# Patient Record
Sex: FEMALE | Race: BLACK OR AFRICAN AMERICAN | NOT HISPANIC OR LATINO | Employment: FULL TIME | URBAN - METROPOLITAN AREA
[De-identification: names, ages, dates, MRNs, and addresses within clinical notes are randomized per-mention and may not be internally consistent; named-entity substitution may affect disease eponyms.]

---

## 2023-03-18 PROBLEM — N92.1 BREAKTHROUGH BLEEDING WITH IUD: Status: ACTIVE | Noted: 2023-03-18

## 2023-03-18 PROBLEM — G47.30 SLEEP DISORDER BREATHING: Status: ACTIVE | Noted: 2023-03-18

## 2023-03-18 PROBLEM — A59.9 TRICHIMONIASIS: Status: ACTIVE | Noted: 2023-03-18

## 2023-03-18 PROBLEM — K21.9 GERD (GASTROESOPHAGEAL REFLUX DISEASE): Status: ACTIVE | Noted: 2023-03-18

## 2023-03-18 PROBLEM — S39.012A LUMBOSACRAL STRAIN: Status: ACTIVE | Noted: 2023-03-18

## 2023-03-18 PROBLEM — R51.9 FREQUENT HEADACHES: Status: ACTIVE | Noted: 2023-03-18

## 2023-03-18 PROBLEM — S46.911A STRAIN OF RIGHT SHOULDER: Status: ACTIVE | Noted: 2023-03-18

## 2023-03-18 PROBLEM — Z98.84 BARIATRIC SURGERY STATUS: Status: ACTIVE | Noted: 2023-03-18

## 2023-03-18 PROBLEM — M79.642 PAIN OF LEFT HAND: Status: ACTIVE | Noted: 2023-03-18

## 2023-03-18 PROBLEM — M54.2 CERVICAL SPINE PAIN: Status: ACTIVE | Noted: 2023-03-18

## 2023-03-18 PROBLEM — M25.569 JOINT PAIN, KNEE: Status: ACTIVE | Noted: 2023-03-18

## 2023-03-18 PROBLEM — N92.6 IRREGULAR MENSES: Status: ACTIVE | Noted: 2023-03-18

## 2023-03-18 PROBLEM — I10 BENIGN HYPERTENSION: Status: ACTIVE | Noted: 2023-03-18

## 2023-03-18 PROBLEM — N94.6 DYSMENORRHEA: Status: ACTIVE | Noted: 2023-03-18

## 2023-03-18 PROBLEM — E66.01 MORBID OBESITY (MULTI): Status: ACTIVE | Noted: 2023-03-18

## 2023-03-18 PROBLEM — N92.0 MENORRHAGIA: Status: ACTIVE | Noted: 2023-03-18

## 2023-03-18 PROBLEM — R29.818 SUSPECTED SLEEP APNEA: Status: ACTIVE | Noted: 2023-03-18

## 2023-03-18 PROBLEM — Z97.5 BREAKTHROUGH BLEEDING WITH IUD: Status: ACTIVE | Noted: 2023-03-18

## 2023-03-18 PROBLEM — S29.019A STRAIN OF THORACIC REGION: Status: ACTIVE | Noted: 2023-03-18

## 2023-03-18 PROBLEM — M54.50 LUMBAR SPINE PAIN: Status: ACTIVE | Noted: 2023-03-18

## 2023-05-05 LAB
CHORIOGONADOTROPIN (MIU/ML) IN SER/PLAS: <3 IU/L
DEHYDROEPIANDROSTERONE SULFATE (DHEA-S) (UG/DL) IN SER/: 135 UG/DL (ref 65–395)
ESTIMATED AVERAGE GLUCOSE FOR HBA1C: 91 MG/DL
ESTRADIOL (PG/ML) IN SER/PLAS: 44 PG/ML
FOLLITROPIN (IU/L) IN SER/PLAS: 8 IU/L
HEMOGLOBIN A1C/HEMOGLOBIN TOTAL IN BLOOD: 4.8 %
PROLACTIN (UG/L) IN SER/PLAS: 10.4 UG/L (ref 3–20)
THYROTROPIN (MIU/L) IN SER/PLAS BY DETECTION LIMIT <= 0.05 MIU/L: 1.7 MIU/L (ref 0.44–3.98)

## 2023-05-10 LAB — 17-HYDROXYPROGESTERONE (REFLAB): 45.55 NG/DL

## 2023-05-12 LAB
TESTOSTERONE FREE (CHAN): 7.7 PG/ML (ref 0.1–6.4)
TESTOSTERONE,TOTAL,LC-MS/MS: 43 NG/DL (ref 2–45)

## 2023-10-09 RX ORDER — ACETAMINOPHEN 325 MG/1
325 TABLET ORAL EVERY 6 HOURS PRN
COMMUNITY
Start: 2021-08-28 | End: 2023-10-26 | Stop reason: WASHOUT

## 2023-10-09 NOTE — PROGRESS NOTES
Subjective     Date: 10/9/2023 Time: 10:39 AM  Name: Nehemiah Ortiz  MRN: [id number]    This is a 23 y.o. female with morbid obesity (There is no height or weight on file to calculate BMI.) who presents to clinic for consideration of bariatric surgery. she has attempted and failed multiple diet and exercise regimens for weight loss. Initial Onset of obesity was during adolescence.  Their goal for surgery is to  be healthier  and lose weight. The patient has tried multiple diets to lose weight including Low Calorie. The patient was most successful with the low calorie diet. The most pounds lost on this diet were 50 lbs. The patient considers their dietary weakness to be portion size The patient reports a  highest weight ever of 425 pounds and lowest weight ever of 350 pounds Distribution of Obesity: is central. C     Comorbidities: polycystic ovarian syndrome         2 = Symptoms noticeable and bothersome but not every day    PMH:   Past Medical History:   Diagnosis Date    Encounter for insertion of intrauterine contraceptive device 10/15/2018    Encounter for IUD insertion    Encounter for pregnancy test, result negative 10/15/2018    Negative pregnancy test    Other conditions influencing health status     Denial Of Any Significant Medical History    Unspecified dislocation of unspecified patella, initial encounter 03/17/2014    Dislocation, patella closed        PSH:   Past Surgical History:   Procedure Laterality Date    KNEE SURGERY  01/06/2014    Knee Surgery        No personal/family hx of VTE.    FAMILY HISTORY:  Family History   Problem Relation Name Age of Onset    Thyroid disease Mother      Hypertension Father      Asthma Other      Depression Other      Hypertension Other          SOCIAL HISTORY:   Smoking currently    MEDICATIONS:  Prior to Admission Medications:  Medication Documentation Review Audit    **Prior to Admission medications have not yet been reviewed**          ALLERGIES:  No Known  Allergies    REVIEW OF SYSTEMS:  GENERAL: Negative for malaise, significant weight loss and fever  HEAD: Negative for headache, swelling.  NECK: Negative for lumps, goiter, pain and significant neck swelling  RESPIRATORY: Negative for cough, wheezing or shortness of breath.  CARDIOVASCULAR: Negative for chest pain, leg swelling or palpitations.  GI: Negative for abdominal discomfort, blood in stools or black stools or change in bowel habits  : No history of dysuria, frequency or incontinence  MUSCULOSKELETAL: Negative for joint pain or swelling, back pain or muscle pain.  SKIN: Negative for lesions, rash, and itching.  PSYCH: Negative for sleep disturbance, mood disorder and recent psychosocial stressors.  ENDOCRINE: Negative for cold or heat intolerance, polyuria, polydipsia and goiter.    Objective   PHYSICAL Exam       Video visit    Assessment/Plan   IMPRESSION:  Nehemiah Ortiz is a 23 y.o. female with a BMI of There is no height or weight on file to calculate BMI. with the following diagnoses and co-morbidities:     Past Medical History:   Diagnosis Date    Encounter for insertion of intrauterine contraceptive device 10/15/2018    Encounter for IUD insertion    Encounter for pregnancy test, result negative 10/15/2018    Negative pregnancy test    Other conditions influencing health status     Denial Of Any Significant Medical History    Unspecified dislocation of unspecified patella, initial encounter 03/17/2014    Dislocation, patella closed       This patient does meet the criteria for a surgical weight loss procedure according to NIH guidelines.  The risks of sleeve gastrectomy, Jelani-en-Y gastric bypass, and duodenal switch surgery including but not limited to bleeding, leak along staple lines, infection, dehydration, ulcers, internal hernia, DVT/PE, pneumonia, myocardial infarction, prolonged nausea/vomiting, incomplete resolution of associated medical conditions, reflux, weight regain, vitamin/mineral  deficiencies, and death have been explained to the patient and Nehemiah Ortiz has expressed understanding and acceptance of them.       She wants to proceed with Gastric ByPass.         We discussed the lifestyle changes necessary to be successful following surgery.    The increased risk of substance and alcohol abuse following bariatric surgery was discussed with the patient, along with the negative consequences of substance/alcohol use after surgery including addiction, worsening of mental health disorders, and injury to the stomach. The risk of smoking and vaping (tobacco or any other substance) after bariatric surgery was explained to the patient. This includes risk of anastamotic ulcers, gastritis, bleeding, perforation, stricture, and PO intolerance.  The patient expressed understanding and acceptance of these risks.    The patient was advised not to become pregnant within 12-18 months following bariatric surgery. She was educated on the increased risks to mother and fetus associated with pregnancy within 2 years of bariatric surgery.    The benefits of the above surgeries including weight loss, improvement/resolution of associated medical and mental health conditions, improved mobility, and decreased mortality have been explained the the patient and Nehemiah Ortiz has expressed understanding and acceptance of them.      PLAN:  The plan of treatment for Nehemiah Ortiz is to continue with the consultations and tests ordered today in hopes of qualifying for pre-operative clearance for bariatric surgery. This includes:    Consult Nutrition for education   Consult Psychology  Consult Cardiology  Consult Pulmonology  Labs ordered  EGD  PCP for medical optimization  Consult sleep medicine - concern for BRIANDA  Recommend at least 5-10 lbs of weight loss prior to surgery.      The following are some lifestyle changes you should begin to prepare you for your surgery.   Eliminate soda and other carbonated beverages  from your diet. Carbonation will not be well tolerated after surgery. Try Propel, Vitamin Water Zero, Sobe Lifewater, Crystal Light or water.    Increase fluid consumption to 64 oz daily. Do not drink within 30 minutes of eating as this will liquefy your food and make you hungry more quickly.    Exercise for 30-60 minutes daily. Brisk walking, bike riding and swimming are all examples of healthy exercise. If you are unable to exercise we recommend seated exercise.    Do not skip meals.    Take a multivitamin daily.    Lose weight. In preparation for your surgery it is important that you begin making healthier food choices now. Our dietitian will meet with you to help you select foods lower in calories and higher in nutrition. We would like you to lose at least 5-10 lbs prior to surgery.     Increase your protein intake to 60 grams per day.    Alcohol is empty calories. Please eliminate while preparing for surgery.    Plan your meals.      General Instruction:   1) Use the information we gave you today to work through your insurance requirements and medical clearances.   2) These documents need to get faxed or emailed to the program navigators so they can submit them for approval from your insurance company.   3) Obtain labs today at a  facility. We will call you with any abnormalities and corrections you need to make.   4) Continue to work with your primary care doctor and other specialist so your other health problems are well controlled prior to your surgery.   5) Adopt the recommendations of the program dietician so you develop healthy eating patterns.   6) Work with the sleep team to get your sleep apnea treated to prevent other health problems .   7) Consider attending a support group to learn from other who have been through the process.   8) Come to the MS[facility] sessions.

## 2023-10-09 NOTE — PROGRESS NOTES
Surgeon: Yaima  Patient is considering:  undecided    ASSESSMENT:  Current weight: 376 lbs    Ht: 63  in   BMI: 66.6       Initial start weight: 376 lbs   Pre-Op Excess Body Weight (EBW):   235 lbs  Target Post-Op weight goal: GB: 188 - 235 lbs; S - 258 lbs    Food allergies/intolerances:  lactose intolerant - no dairy; no beef or pork  Chewing/Swallowing/Dentition:  none  Nausea / Vomiting / Hx Gastroparesis:  none  Diarrhea/ Constipation: none  Exercise level:   4 days/week 1-2 hours  Smoking/Tobacco use: yes smoking   Vitamins/Minerals supplements:  MVI   Medications:   see list  Past diet attempts:   exercise/, vegan, cabbage soup, low CHO, keto   Hours of sleep/night: 9-10    24 HOUR RECALL/DIET HISTORY:  Breakfast:  x2 HB eggs, grapefruit and turkey plascencia  Snack:    Lunch: salad no meat  Snack:   Dinner:  chicken breast, cauliflower rice, broccoli   Snack: sometimes grapes   Beverages: 1 gallon water and green tea, coffee occasionally   Alcohol: none    Person responsible for cooking & shopping? Self  How often do you eat sweet snacks?  Granola and fruit   How often do you eat savory snacks?  Pretzels mostly   How often do you eat out?  1/month  Do you feel overly stuffed?  No  Binge Eating? No  Night Eating?  No  Emotional Eating?  Tends to not eat when feeling stressed or depressed       READINESS TO LEARN:  Motivation to learn: Interested        Understanding of instruction: Good    Anticipated Compliance: Good         Family Support: Unable to assess-family not present          Educational Materials Provided:    Plate Method  High Protein Snack List  High Protein Drink - lactose free  High Protein food list  Schedules for MSWL class  Goals sheet    Appointment was conducted via phone d/t COVID-19 protocol. Patient's weight is self-reported. Patient will scheduled to attend a Virtual Education Class to review the 2 week Pre-op diet, Post op fluid, protein,  vitamin/mineral supplementation, exercise goals and post op diet progression.    Patient presents with excessive calorie obesity seeking weight loss surgery.    Patient seen today to complete nutrition evaluation for WLS. Patient has been in process before to get to surgery, feels she is more ready now. Patient has been working with a  and had been following their recommended meal plan. Patient is trying to eat 3 meals/day, breakfast can be a challenging meal. Patient reports only drinking water and some tea/coffee.   Recommended to continue to eat 3 meals/day reminded to aim for 3-4oz protein (20-30g) per meal. Reviewed post op behaviors and reminded to begin practicing. Continue exercise, MVI daily.     Patient was receptive to nutritional recommendations, asked numerous questions, and verbalized understanding of the weight loss surgery diet.  Patient expressed understanding about the importance of strict dietary compliance post-surgery to avoid nutritional deficiencies and achieve optimal weight loss and verbalized intent to follow dietary recommendations.      Malnutrition Screening:  Significant unintentional weight loss? No  Eating less than 75% of usual intake for more than 2 weeks? No      Nutrition Diagnosis:   1. Overweight/obesity related to excess energy intake as evidenced by BMI = 40 kg/m^2.  2. Food- and nutrition-related knowledge deficit related to lack of prior exposure to surgical weight loss information as evidenced by pt new to surgical program.    Nutrition Interventions:   1. Modify type and amount of food and nutrients within meals and snacks.  2. Comprehensive Nutrition Education    Recommendations:  1. Structure meal patterns, eating three meals and 1-2 snacks per day.   2. Aim for 3-4 ounces (20-30 grams) protein at each meal, 7-15g protein at snacks.  3. Practice eating slower by taking smaller bites and chewing your food thoroughly.   4. Drink 64oz of calorie-free, caffeine-free,  and non-carbonated beverages.   5. Practice no drinking 30 minutes before meals, nothing with meals and wait 30 minutes after meals to drink again.  6. Continue to exercise for 1-2 hours 4 days/week.  7. Your insurance requires 6 months of Medically Supervised Weight Loss (MSWL) classes. You will need to attend a class in October, November, and December. We will follow up for your 5th visit on January 15th  at 3:30 PM. You will need to weigh yourself before this appointment and provide a 24 hour food recall.      Pre-op Goal weight: lose 5% of body weight    Nutrition Monitoring and Evaluation: 1-2 pound weight loss per week  Criteria: weight check  Need for Follow-up:     Patient does meet [employer] Health guidelines for weight loss surgery, however needs to demonstrate consistent effort in making dietary changes before giving clearance. It is anticipated that the patient will need at least 1    nutritional follow-up visits prior to clearance for surgery.

## 2023-10-10 ENCOUNTER — NUTRITION (OUTPATIENT)
Dept: SURGERY | Facility: HOSPITAL | Age: 23
End: 2023-10-10
Payer: COMMERCIAL

## 2023-10-10 ENCOUNTER — TELEMEDICINE (OUTPATIENT)
Dept: SURGERY | Facility: HOSPITAL | Age: 23
End: 2023-10-10
Payer: COMMERCIAL

## 2023-10-10 VITALS — BODY MASS INDEX: 66.61 KG/M2 | WEIGHT: 293 LBS

## 2023-10-10 DIAGNOSIS — Z98.84 BARIATRIC SURGERY STATUS: ICD-10-CM

## 2023-10-10 DIAGNOSIS — E66.01 MORBID OBESITY (MULTI): ICD-10-CM

## 2023-10-10 DIAGNOSIS — E66.01 CLASS 3 SEVERE OBESITY WITH BODY MASS INDEX (BMI) OF 60.0 TO 69.9 IN ADULT, UNSPECIFIED OBESITY TYPE, UNSPECIFIED WHETHER SERIOUS COMORBIDITY PRESENT (MULTI): Primary | ICD-10-CM

## 2023-10-10 DIAGNOSIS — K21.9 GASTROESOPHAGEAL REFLUX DISEASE WITHOUT ESOPHAGITIS: ICD-10-CM

## 2023-10-10 PROCEDURE — 99213 OFFICE O/P EST LOW 20 MIN: CPT | Performed by: SURGERY

## 2023-10-10 PROCEDURE — 99213 OFFICE O/P EST LOW 20 MIN: CPT | Mod: GT | Performed by: SURGERY

## 2023-10-10 NOTE — PATIENT INSTRUCTIONS
The plan of treatment for Nehemiah Ortiz is to continue with the consultations and tests ordered today in hopes of qualifying for pre-operative clearance for bariatric surgery( Gastric Bypass) . This includes:    Consult Nutrition for education   Consult Psychology  Consult Cardiology  Consult Pulmonology  Labs ordered  EGD  PCP for medical optimization  Consult sleep medicine - concern for BRIANDA  Recommend at least 5-10 lbs of weight loss prior to surgery.      The following are some lifestyle changes you should begin to prepare you for your surgery.   Eliminate soda and other carbonated beverages from your diet. Carbonation will not be well tolerated after surgery. Try Propel, Vitamin Water Zero, Sobe Lifewater, Crystal Light or water.    Increase fluid consumption to 64 oz daily. Do not drink within 30 minutes of eating as this will liquefy your food and make you hungry more quickly.    Exercise for 30-60 minutes daily. Brisk walking, bike riding and swimming are all examples of healthy exercise. If you are unable to exercise we recommend seated exercise.    Do not skip meals.    Take a multivitamin daily.    Lose weight. In preparation for your surgery it is important that you begin making healthier food choices now. Our dietitian will meet with you to help you select foods lower in calories and higher in nutrition. We would like you to lose at least 5-10 lbs prior to surgery.     Increase your protein intake to 60 grams per day.    Alcohol is empty calories. Please eliminate while preparing for surgery.    Plan your meals.      General Instruction:   1) Use the information we gave you today to work through your insurance requirements and medical clearances.   2) These documents need to get faxed or emailed to the program navigators so they can submit them for approval from your insurance company.   3) Obtain labs today at a  facility. We will call you with any abnormalities and corrections you need to make.    4) Continue to work with your primary care doctor and other specialist so your other health problems are well controlled prior to your surgery.   5) Adopt the recommendations of the program dietician so you develop healthy eating patterns.   6) Work with the sleep team to get your sleep apnea treated to prevent other health problems .   7) Consider attending a support group to learn from other who have been through the process.   8) Come to the MS[facility] sessions.

## 2023-10-21 PROBLEM — R29.818 SUSPECTED SLEEP APNEA: Status: RESOLVED | Noted: 2023-03-18 | Resolved: 2023-10-21

## 2023-10-21 NOTE — PATIENT INSTRUCTIONS
[facility] Sleep Medicine  [facility] ONE  [facility] ONE  [address] [address]       Thank you for coming to the Sleep Medicine Clinic today! Your sleep medicine provider today was: ISIDRA Saldana Below is a summary of your treatment plan, patient education, other important information, and our contact numbers.    Dear Nehemiah Ortiz,    Great to meet you today.  Thank you for visiting  Sleep Medicine Clinic !     1. We will proceed with a SPLIT NIGHT sleep study. The lab will call you and schedule it for you.     2. Please do not drive when you are sleepy and  start practicing the sleep hygiene  as discussed in clinic today.     3. Your blood pressure was elevated in the office today. Please obtain a home blood pressure monitoring kit and log your blood pressures at home and follow up with your primary care physician.    4. We are going to do blood work to investigate your restless legs syndrome. Please fast 8 hours prior blood draw.    5. FOR QUESTIONS AND CONCERNS:   a): To schedule, cancel, or reschedule SLEEP STUDY appointments, please call [address] [place] [address]  b): Please call my office with issues or questions: [phone] ([place]); [phone] ([facility])    If you have a CPAP or BiPAP machine at home, please bring the unit and all accessories including the power cord to your appointments unless I tell you otherwise. Please have knowledge of the DME company you worked with to receive your PAP device. If you have copies of any previous sleep testing completed outside of , please bring with you to clinic as well. This information will make our visits more productive.     If you are new to CPAP or BiPAP, please note the minimum usage insurance requires to continuing coverage for the equipment as noted by your DME company. Please discuss equipment issues (PAP unit, mask fit, humidification, etc.) with your DME company first.       In the event that  you are running more than 10 minutes late to your appointment, I will kindly ask you to reschedule. Thanks.    Follow-up Appointment: 2-3 weeks after sleep study      Follow-up Appointment: 2-3 weeks after sleep study    PATIENT EDUCATION     Obstructive Sleep Apnea (BRIANDA) is a sleep disorder where your upper airway muscles relax during sleep and the airway intermittently and repetitively narrows and collapses leading to partially blocked airway (hypopnea) or completely blocked airway (apnea) which, in turn, can disrupt breathing in sleep, lower oxygen levels while you sleep and cause night time wakings. Because both apnea and hypopnea may cause higher carbon dioxide or low oxygen levels, untreated BRIANDA can lead to heart arrhythmia, elevation of blood pressure, and make it harder for the body to consolidate memory and facilitate metabolism (leading to higher blood sugars at night). Frequent partial arousals occur during sleep resulting in sleep deprivation and daytime sleepiness. BRIANDA is associated with an increased risk of cardiovascular disease, stroke, hypertension, and insulin resistance. Moreover, untreated BRIANDA with excessive daytime sleepiness can increase the risk of motor vehicular accidents.    Some conservative strategies for BRIANDA regardless of BRIANDA severity are:   Positional therapy - Avoid sleeping on your back.   Healthy diet and regular exercise to optimize weight is highly encouraged.   Avoid alcohol late in the evening and sedative-hypnotics as these substances can make sleep apnea worse.   Improve breathing through the nose with intranasal steroid spray, saline rinse, or antihistamines    Safety: Avoid driving vehicle and operating heavy equipment while sleepy. Drowsy driving may lead to life-threatening motor vehicle accidents. A person driving while sleepy is 5 times more likely to have an accident. If you feel sleepy, pull over and take a short power nap (sleep for less than 30 minutes). Otherwise,  ask somebody to drive you.    Treatment options for sleep apnea include weight management, positional therapy, Positive Airway Therapy (PAP) therapy, oral appliance therapy, hypoglossal nerve stimulator (Inspire) and select airway surgeries.    You can also go to the following EDUCATION WEBSITES for further information:   American Academy of Sleep Medicine http://sleepeducation.org  National Sleep Foundation: https://sleepfoundation.org  American Sleep Apnea Association: https://www.sleepapnea.org (for patients with sleep apnea)  Narcolepsy Network: https://www.narcolepsynetwork.org (for patients with narcolepsy)  [employer]: https://www.Pel[employer] Technologycolepsy.org (for patients with narcolepsy)  Hypersomnia Foundation: https://www.hypersomniafoundation.org (for patients with idiopathic hypersomnia)  RLS foundation: https://www.rls.org (for patients with restless leg syndrome)    IMPORTANT INFORMATION     Call 911 for medical emergencies.  Our offices are generally open from Monday-Friday, 8 am - 5 pm.   There are no supporting services by either the sleep doctors or their staff on weekends and Holidays, or after 5 PM on weekdays.   If you need to get in touch with me, you may either call my office number or you can use [employer].  If a referral for a test, for CPAP, or for another specialist was made, and you have not heard about scheduling this within a week, please call scheduling at [phone]).  If you are unable to make your appointment for clinic or an overnight study, kindly call the office or sleep testing center at least 48 hours in advance to cancel and reschedule.  If you are on CPAP, please bring your device's card and/or the device to each clinic appointment.   In case of problems with PAP machine or mask interface, please contact your [employer] (Durable Medical Equipment) company first. [employer] is the company who provides you the machine and/or PAP supplies.       PRESCRIPTIONS     We require 7 days  advanced notice for prescription refills. If we do not receive the request in this time, we cannot guarantee that your medication will be refilled in time.    IMPORTANT PHONE NUMBERS     Sleep Medicine Clinic Fax: [phone]  Appointments (for Pediatric Sleep Clinic): [phone] - option 1  Appointments (for Adult Sleep Clinic): [phone] - option 2  Appointments (For Sleep Studies): [phone] - option 3  Behavioral Sleep Medicine: [phone]  Sleep Surgery: [phone]  Nutrition Service: [phone]  Weight management clinics with endocrinology: [phone]  Bariatric Services: [phone] (includes weight loss medications and weight loss surgery)  [facility]: [phone] (offers holistic approaches to weight management)  ENT (Otolaryngology): [phone]  Headache Clinic (Neurology): [phone]  Neurology: [phone]  Psychiatry: [phone]  Pulmonary Function Testing (PFT) Center: [phone]  Pulmonary Medicine: [phone]  [employer] (DME): [phone]      OUR SLEEP TESTING LOCATIONS     Our team will contact you to schedule your sleep study, however, you can contact us as follow:  Main Phone Line (scheduling only): [phone]), option 3  Adult and Pediatric Locations  [facility] (6 years and older): [facility] - 4th floor ([address] [place] [address]) After hours line: [phone]  [facility] (Main campus: All ages): [facility], 6th floor. After hours line: [phone]   [place] (5 years and older; younger considered on case-by-case basis): [address]; [address], [address]. Scheduling  After hours line: [phone]   [place] (6 years and older): [address]; [address]; [address]   [place] (6 years and older): [address] [facility], [address]  After hours line: [phone]  [facility] (13 years and older) in [place]:  "[address], 2nd floor  After hours line: [phone]   [place] (13 year and older): [address]  After hours line: [phone]     Adult Only Locations:   [place] (18 years and older): [address] [facility], 2nd floor   [place] (18 years and older): [address] [facility]; 4th floor  After hours line: [phone]  [facility] (18 years and older) at [place]: [address] [facility]  After hours line: [phone]     CONTACTING YOUR SLEEP MEDICINE PROVIDER AND SLEEP TEAM      For issues with your machine or mask interface, please call your DME provider first. DME stands for durable medical company. DME is the company who provides you the machine and/or PAP supplies / accessories.   To schedule, cancel, or reschedule SLEEP STUDY APPOINTMENTS, please call the Main Phone Line at [phone] - option 3.   To schedule, cancel, or reschedule CLINIC APPOINTMENTS, you can do it in \"[employer]\", call [phone] to speak with my  (Cintia Cedillo), or call the Main Phone Line at [phone] - option 2  For CLINICAL QUESTIONS or MEDICATION REFILLS, please call direct line for Adult Sleep Nurses at [phone].   Lastly, you can also send a message directly to your provider through \"My Chart\", which is the email service through your  Records Account: [email or web address]       Here at [facility], we wish you a restful sleep!   "

## 2023-10-21 NOTE — PROGRESS NOTES
[facility] Sleep Medicine  [facility] ONE  [facility] ONE  [address] [place] [address]     [facility] Sleep Medicine Clinic  New Visit Note  Subjective   Patient ID: Nehemiah Ortiz is a 23 y.o. female with past medical history significant for morbid obesity, hypertension sleep disorder breathing, and bariatric surgery status.    Patient is here alone today and referred by Dr. Erwin for comprehensive sleep medicine evaluation due to snoring.    HPI  Patient had been having snoring for the past 10 years. But did not try anything to treat it.    SLEEP STUDY HISTORY: (personally reviewed raw data such as interpretation report, data sheet, hypnogram, and titration table if available and applicable)  Not done      SLEEP-WAKE SCHEDULE  Bedtime: 8-9 PM on weekdays, 9-10 PM on weekends  Subjective sleep latency: 10 minutes  Problems falling asleep: N  Number of awakenings: 0-1 times per night spontaneously for bathroom  Problems staying asleep: N  Final wake time: 5AM on weekdays, 8-9 AM on weekends  Out of bed time: 5AM on weekdays, 9 AM on weekends  Shift work: N  Naps: N  Average sleep duration (excluding naps): 8-9 hours    SLEEP ENVIRONMENT  Sleep location: bed  Sleep status: sleeps with boy friend  Room is dark:  Yes  Room is quiet: Yes  Room is cool: Yes  Bed comfort: good    SLEEP HABITS:   Activities before bedtime: N  Activities in bed: N  Preferred sleep position: stomach  Clockwatching: No   Uses alarm to wake up: No     SLEEP ROS:  Night symptoms: Patient denies symptoms of snoring or breathing concerns at night  Morning symptoms: Patient denies morning symptoms and admits waking up refreshed in the morning.   Daytime symptoms Patient denies daytime sleepiness, fatigue, and drowsy driving. Patient also denies issues with memory, mood, and concentration.  Hypersomnia / narcolepsy symptoms: Patient denies symptoms of a hypersomnolence disorder such as  sleep paralysis, sleep-related hallucinations, recurrent sleep attacks, automatic behaviors, and cataplexy.   RLS symptoms: Patient denies RLS symptoms.  Movements in sleep: Patient denies problematic movements in sleep,   Parasomnia symptoms: Patient denies symptoms of parasomnia.    REVIEW OF SYSTEMS: All other systems have been reviewed and are negative.    PERTINENT SOCIAL HISTORY:  Occupation:  in the [employer]  Smoking: No   ETOH: No   Marijuana: No   Caffeine: Yes  Sleep aids: No   Claustrophobia: No     PERTINENT PAST SURGICAL HISTORY:  non-contributory    PERTINENT FAMILY HISTORY:  Patient denies family history of any sleep disorder.    Active Problems, Allergy List, Medication List, and PMH/PSH/FH/Social Hx have been reviewed and reconciled in chart. No significant changes unless documented in the pertinent chart section. Updates made when necessary.       Objective     Vitals:    10/25/23 1128   BP: 130/81   Pulse: 53   Resp: 16   SpO2: 97%        ESS: 9  GHADA :2    Physical Exam  Constitutional:alert and oriented to time, place, and person  Sinus: No tenderness to palpation  Palate: High-arched   Mallampati 2, Tonsils: 3  Yes, Tongue scalloping, Yes: macroglossia  Lungs: Clear to auscultation bilateral, no rales  Heart: Regular rate and rhythm, no murmurs    Assessment/Plan   Nehemiah Ortiz is a 23 y.o. female who is seen to evaluate for sleep disorder breathing possible obstructive sleep apnea. The pathophysiology of sleep apnea, diagnostic testing ( PSG), treatment options (PAP, oral appliance, surgery, hypoglossal nerve stimulator called Inspire), and supportive management (weight loss, positional therapy, smoking cessation, avoidance of alcohol and sedatives) were discussed with the patient in detail. Risk factors of sleep apnea as well as cardiometabolic and neurocognitive sequelae associated with untreated sleep apnea were also discussed. Lastly, patient was advised to avoid driving vehicle or  operating heavy machinery when sleepy.     Nehemiah Ortiz with the following problems:     SLEEP DISORDERED BREATHING:  -This is likely sleep apnea based on the the history and physical examination. STOP-BANG:  Nehemiah Ortizdomingo not yet had a sleep study.  -Instruct patient to complete in lab/ split night/ titration sleep study.  -HSAT is reasonable as patient likely has BRIANDA based on history and exam and does not have any of the following comorbidities: CHF, neuromuscular weakness, hypoventilation, or significant COPD.  -We consider treatment as indicated when testing is complete.    HYPERTENSION:  -BP was 130/81  -Denies headache, palpitation or syncope in the clinic.  -F/U PCP     MORBID OBESITY  -with a BMI of 67.15. Nehemiah Ortiz most recent Bicarbonate was 22 in Mar/2022.       RTC 2-3 weeks after sleep study    Problem List Items Addressed This Visit             ICD-10-CM    Bariatric surgery status - Primary Z98.84    Benign hypertension I10    Morbid obesity (CMS/[facility]) E66.01    Sleep disorder breathing G47.30       All of patient's questions were answered. She verbalizes understanding and agreement with my assessment and plan.

## 2023-10-25 ENCOUNTER — OFFICE VISIT (OUTPATIENT)
Dept: SLEEP MEDICINE | Facility: CLINIC | Age: 23
End: 2023-10-25
Payer: COMMERCIAL

## 2023-10-25 ENCOUNTER — NUTRITION (OUTPATIENT)
Dept: SURGERY | Facility: CLINIC | Age: 23
End: 2023-10-25

## 2023-10-25 VITALS
BODY MASS INDEX: 51.91 KG/M2 | HEIGHT: 63 IN | HEART RATE: 53 BPM | OXYGEN SATURATION: 97 % | DIASTOLIC BLOOD PRESSURE: 81 MMHG | SYSTOLIC BLOOD PRESSURE: 130 MMHG | WEIGHT: 293 LBS | RESPIRATION RATE: 16 BRPM

## 2023-10-25 VITALS — BODY MASS INDEX: 66.61 KG/M2 | WEIGHT: 293 LBS

## 2023-10-25 DIAGNOSIS — Z98.84 BARIATRIC SURGERY STATUS: Primary | ICD-10-CM

## 2023-10-25 DIAGNOSIS — G47.30 SLEEP DISORDER BREATHING: ICD-10-CM

## 2023-10-25 DIAGNOSIS — E66.01 MORBID OBESITY (MULTI): ICD-10-CM

## 2023-10-25 DIAGNOSIS — I10 BENIGN HYPERTENSION: ICD-10-CM

## 2023-10-25 PROCEDURE — 3008F BODY MASS INDEX DOCD: CPT

## 2023-10-25 PROCEDURE — 3074F SYST BP LT 130 MM HG: CPT

## 2023-10-25 PROCEDURE — 3075F SYST BP GE 130 - 139MM HG: CPT

## 2023-10-25 PROCEDURE — 99204 OFFICE O/P NEW MOD 45 MIN: CPT

## 2023-10-25 PROCEDURE — 3079F DIAST BP 80-89 MM HG: CPT

## 2023-10-25 PROCEDURE — 1036F TOBACCO NON-USER: CPT

## 2023-10-25 PROCEDURE — 3078F DIAST BP <80 MM HG: CPT

## 2023-10-25 PROCEDURE — 99214 OFFICE O/P EST MOD 30 MIN: CPT

## 2023-10-25 RX ORDER — SERTRALINE HYDROCHLORIDE 100 MG/1
1 TABLET, FILM COATED ORAL
COMMUNITY
Start: 2022-04-11 | End: 2023-10-26 | Stop reason: WASHOUT

## 2023-10-25 RX ORDER — BISMUTH SUBSALICYLATE 262 MG
1 TABLET,CHEWABLE ORAL DAILY
COMMUNITY

## 2023-10-25 ASSESSMENT — PATIENT HEALTH QUESTIONNAIRE - PHQ9
2. FEELING DOWN, DEPRESSED OR HOPELESS: NOT AT ALL
1. LITTLE INTEREST OR PLEASURE IN DOING THINGS: NOT AT ALL
SUM OF ALL RESPONSES TO PHQ9 QUESTIONS 1 AND 2: 0
SUM OF ALL RESPONSES TO PHQ9 QUESTIONS 1 & 2: 0
2. FEELING DOWN, DEPRESSED OR HOPELESS: NOT AT ALL
1. LITTLE INTEREST OR PLEASURE IN DOING THINGS: NOT AT ALL

## 2023-10-25 ASSESSMENT — PAIN SCALES - GENERAL: PAINLEVEL: 0-NO PAIN

## 2023-10-25 ASSESSMENT — ENCOUNTER SYMPTOMS: DEPRESSION: 0

## 2023-10-25 ASSESSMENT — COLUMBIA-SUICIDE SEVERITY RATING SCALE - C-SSRS
6. HAVE YOU EVER DONE ANYTHING, STARTED TO DO ANYTHING, OR PREPARED TO DO ANYTHING TO END YOUR LIFE?: NO
2. HAVE YOU ACTUALLY HAD ANY THOUGHTS OF KILLING YOURSELF?: NO
1. IN THE PAST MONTH, HAVE YOU WISHED YOU WERE DEAD OR WISHED YOU COULD GO TO SLEEP AND NOT WAKE UP?: NO

## 2023-10-25 NOTE — PROGRESS NOTES
PREOPERATIVE, MULTIDISCIPLINARY, MEDICALLY SUPERVISED, REDUCED CALORIE DIET, BEHAVIOR MODIFICATION AND EXERCISE PROGRAM    S:      O:    Wt:#[id number]     Ht:               BMI: BMI@    Goal: 5% body weight loss over the course of program    Dietary recommendation:   1. Eliminate high calorie, carbonated, and caffeinated beverages  2. Practice the 30-30-30 rule by drinking between meals.  3. Structure your meal plan - have 3 meals and 1 snack daily.  4. Have balanced meals that always contain a good source of protein.  5. Increase intake of non-starchy vegetables.  Have 5 servings fruits and vegetables daily.   6. Take a multivitamin daily.  7. Increase physical activity by 10-15 minutes to an end goal of 60 minutes 5 x per week.    Group Topic: Eating Triggers and Controlling Environment    Behavioral recommendation: Pt will be able to identify eating triggers and how to avoid them.    A/P: Pt appears to be able to recognize eating triggers and how to avoid eating when not hungry and/or not eating when it is not time for a meal or snack. Pt will continue to practice being mindful of healthy eating habits.    Exercise: Cardio 6 times a week for 30 minutes     Elsa Godinez, MOHSEN, LD

## 2023-10-26 ENCOUNTER — OFFICE VISIT (OUTPATIENT)
Dept: CARDIOLOGY | Facility: CLINIC | Age: 23
End: 2023-10-26
Payer: COMMERCIAL

## 2023-10-26 VITALS
BODY MASS INDEX: 51.91 KG/M2 | WEIGHT: 293 LBS | OXYGEN SATURATION: 95 % | HEIGHT: 63 IN | SYSTOLIC BLOOD PRESSURE: 127 MMHG | DIASTOLIC BLOOD PRESSURE: 78 MMHG

## 2023-10-26 DIAGNOSIS — Z01.810 PREOP CARDIOVASCULAR EXAM: Primary | ICD-10-CM

## 2023-10-26 DIAGNOSIS — Z98.84 BARIATRIC SURGERY STATUS: ICD-10-CM

## 2023-10-26 PROCEDURE — 99213 OFFICE O/P EST LOW 20 MIN: CPT | Performed by: STUDENT IN AN ORGANIZED HEALTH CARE EDUCATION/TRAINING PROGRAM

## 2023-10-26 PROCEDURE — 1036F TOBACCO NON-USER: CPT | Performed by: STUDENT IN AN ORGANIZED HEALTH CARE EDUCATION/TRAINING PROGRAM

## 2023-10-26 PROCEDURE — 93005 ELECTROCARDIOGRAM TRACING: CPT | Performed by: STUDENT IN AN ORGANIZED HEALTH CARE EDUCATION/TRAINING PROGRAM

## 2023-10-26 PROCEDURE — 99203 OFFICE O/P NEW LOW 30 MIN: CPT | Performed by: STUDENT IN AN ORGANIZED HEALTH CARE EDUCATION/TRAINING PROGRAM

## 2023-10-26 PROCEDURE — 3008F BODY MASS INDEX DOCD: CPT | Performed by: STUDENT IN AN ORGANIZED HEALTH CARE EDUCATION/TRAINING PROGRAM

## 2023-10-26 PROCEDURE — 3078F DIAST BP <80 MM HG: CPT | Performed by: STUDENT IN AN ORGANIZED HEALTH CARE EDUCATION/TRAINING PROGRAM

## 2023-10-26 PROCEDURE — 3074F SYST BP LT 130 MM HG: CPT | Performed by: STUDENT IN AN ORGANIZED HEALTH CARE EDUCATION/TRAINING PROGRAM

## 2023-10-26 ASSESSMENT — ENCOUNTER SYMPTOMS
LOSS OF SENSATION IN FEET: 0
OCCASIONAL FEELINGS OF UNSTEADINESS: 0
DEPRESSION: 0

## 2023-10-26 ASSESSMENT — PAIN SCALES - GENERAL: PAINLEVEL: 0-NO PAIN

## 2023-10-26 NOTE — PATIENT INSTRUCTIONS
Dear Nehemiah Ortiz,    It was a pleasure meeting you today at the Cardiology office. As we dicussed, your clinical condition is an elevated BMI with plans of bariatric surgery. I recommended surgical clearance for your procedure without needing any additional testing. My assessment was described in your chart.  Please follow up with Cardiology just as needed.    Sincerely,     Navneet Clemons MD

## 2023-10-26 NOTE — PROGRESS NOTES
"Chief Complaint  Patient was referred to Cardiology by Dr. Erwin for New Patient Visit and Pre-op Clearance.    History Of Present Illness:    Nehemiah Ortiz is a 23 y.o. female, with history significant for BMI of 67.5 and GERD, and who visits Cardiology today as a new patient  for preop clearance of bariatric surgery.  Patient denies chest pain, dyspnea on exertion, shortness of breath, orthopnea, PND, nocturia, edema, palpitations, dizziness, lightheadedness, syncope, or claudication.  She is able to climb 5 flights of stairs without stopping.     Today's ECG shows sinus rhythm at 76 bpm, normal AV conduction and ventricular repolarization     Past Medical History:  BMI 67.5 and GERD.    Past Surgical History:  She has a past surgical history that includes Knee surgery (01/06/2014).    Social History:  She reports that she has quit smoking. Her smoking use included cigarettes. She has never used smokeless tobacco. She reports that she does not currently use drugs after having used the following drugs: Marijuana. She reports that she does not drink alcohol.    Family History   Problem Relation Name Age of Onset    Thyroid disease Mother      Hypertension Father      Asthma Other      Depression Other      Hypertension Other       Allergies:  Patient has no known allergies.    Outpatient Medications:  Current Outpatient Medications   Medication Instructions    acetaminophen (TYLENOL) 325 mg, oral, Every 6 hours PRN    multivitamin tablet 1 tablet, oral, Daily    sertraline (Zoloft) 100 mg tablet 1 tablet, oral, Daily with breakfast     Last Recorded Vitals:  Vitals:    10/26/23 1359 10/26/23 1401   BP: 139/76 127/78   BP Location: Left arm Right arm   Patient Position: Sitting Sitting   BP Cuff Size: Adult Adult   SpO2: 95%    Weight: (!) 173 kg (381 lb 4.8 oz)    Height: 1.6 m (5' 3\")      Physical Exam:  General: Sitting up comfortably in chair; in no apparent distress.  HEENT: Normocephalic; atraumatic. Well " hydrated.  Eyes: Anicteric sclera. Extraocular movement intact.  Neck: Supple; no thyromegaly; normal jugular venous pressure, no bruits.  Respiratory: Bilateral air entry equal. No wheezing.  Cardiovascular: Normal S1, S2; no murmurs auscultated.  Abdomen: Nondistended; nontender. (+) bowel sounds.  Extremities: No peripheral edema present. Pulses 2+ diffusely.  Neurological: Oriented to time, place, and person; nonfocal.  Psychiatric: Normal affect.     Last Labs Reviewed:  CBC -  Lab Results   Component Value Date    WBC 8.0 03/29/2022    HGB 12.6 03/29/2022    HCT 38.3 03/29/2022    MCV 89 03/29/2022     03/29/2022     CMP -  Lab Results   Component Value Date    CALCIUM 9.1 03/29/2022    PROT 6.9 03/29/2022    ALBUMIN 4.0 03/29/2022    AST 14 03/29/2022    ALT 19 03/29/2022    ALKPHOS 62 03/29/2022    BILITOT 0.3 03/29/2022     LIPID PANEL -   Lab Results   Component Value Date    CHOL 203 (H) 09/24/2018    TRIG 75 09/24/2018    HDL 43.7 09/24/2018    CHHDL 4.6 09/24/2018    LDLF 144 (H) 09/24/2018    VLDL 15 09/24/2018    NHDL 159 (H) 09/24/2018     RENAL FUNCTION PANEL -   Lab Results   Component Value Date    GLUCOSE 83 03/29/2022     03/29/2022    K 4.2 03/29/2022     (H) 03/29/2022    CO2 25 03/29/2022    ANIONGAP 13 03/29/2022    BUN 10 03/29/2022    CREATININE 0.69 03/29/2022    CALCIUM 9.1 03/29/2022    ALBUMIN 4.0 03/29/2022      Lab Results   Component Value Date    HGBA1C 4.8 05/05/2023    HGBA1C 4.5 04/06/2022     Last Cardiology/Imaging Tests Reviewed:  ECG:  3/1/2023  Sinus arrhythmia at 64 bpm, normal AV conduction and incomplete RBBB.    Assessment/Plan   23 y.o. female, with history significant for BMI of 67.5 and GERD, and who visits Cardiology today as a new patient for preop clearance of bariatric surgery. She is asymptomatic from the CV standpoint and has a normal EKG. No significant CV findings in the physical exam.    Recommendations:  - No contraindication from the  cardiovascular standpoint for bariatric surgery with general anesthesia.  - Expected karlos operative CV risk is <0.1%    Navneet Clemons MD

## 2023-10-31 ENCOUNTER — OFFICE VISIT (OUTPATIENT)
Dept: BEHAVIORAL HEALTH | Facility: HOSPITAL | Age: 23
End: 2023-10-31
Payer: COMMERCIAL

## 2023-10-31 VITALS — WEIGHT: 293 LBS | BODY MASS INDEX: 51.91 KG/M2 | HEIGHT: 63 IN

## 2023-10-31 DIAGNOSIS — E66.01 MORBID OBESITY (MULTI): ICD-10-CM

## 2023-10-31 DIAGNOSIS — Z72.4 PROBLEMS RELATED TO INAPPROPRIATE DIET AND EATING HABITS: ICD-10-CM

## 2023-10-31 DIAGNOSIS — Z86.59 HISTORY OF DEPRESSION: ICD-10-CM

## 2023-10-31 DIAGNOSIS — Z98.84 BARIATRIC SURGERY STATUS: ICD-10-CM

## 2023-10-31 DIAGNOSIS — F43.10 PTSD (POST-TRAUMATIC STRESS DISORDER): ICD-10-CM

## 2023-10-31 PROCEDURE — 3008F BODY MASS INDEX DOCD: CPT | Performed by: PSYCHOLOGIST

## 2023-10-31 PROCEDURE — 90791 PSYCH DIAGNOSTIC EVALUATION: CPT | Mod: AH | Performed by: PSYCHOLOGIST

## 2023-10-31 PROCEDURE — 3078F DIAST BP <80 MM HG: CPT | Performed by: PSYCHOLOGIST

## 2023-10-31 PROCEDURE — 90791 PSYCH DIAGNOSTIC EVALUATION: CPT | Performed by: PSYCHOLOGIST

## 2023-10-31 PROCEDURE — 3074F SYST BP LT 130 MM HG: CPT | Performed by: PSYCHOLOGIST

## 2023-10-31 PROCEDURE — 1036F TOBACCO NON-USER: CPT | Performed by: PSYCHOLOGIST

## 2023-10-31 ASSESSMENT — ANXIETY QUESTIONNAIRES
GAD7 TOTAL SCORE: 1
6. BECOMING EASILY ANNOYED OR IRRITABLE: NOT AT ALL
7. FEELING AFRAID AS IF SOMETHING AWFUL MIGHT HAPPEN: NOT AT ALL
2. NOT BEING ABLE TO STOP OR CONTROL WORRYING: NOT AT ALL
4. TROUBLE RELAXING: NOT AT ALL
5. BEING SO RESTLESS THAT IT IS HARD TO SIT STILL: NOT AT ALL
3. WORRYING TOO MUCH ABOUT DIFFERENT THINGS: NOT AT ALL
1. FEELING NERVOUS, ANXIOUS, OR ON EDGE: SEVERAL DAYS

## 2023-10-31 ASSESSMENT — PAIN SCALES - GENERAL: PAINLEVEL: 0-NO PAIN

## 2023-10-31 ASSESSMENT — PATIENT HEALTH QUESTIONNAIRE - PHQ9
1. LITTLE INTEREST OR PLEASURE IN DOING THINGS: NOT AT ALL
2. FEELING DOWN, DEPRESSED OR HOPELESS: NOT AT ALL
SUM OF ALL RESPONSES TO PHQ9 QUESTIONS 1 & 2: 0

## 2023-10-31 ASSESSMENT — LIFESTYLE VARIABLES
AUDIT-C TOTAL SCORE: 1
HOW OFTEN DO YOU HAVE SIX OR MORE DRINKS ON ONE OCCASION: NEVER
AUDIT-C TOTAL SCORE: 1
HOW MANY STANDARD DRINKS CONTAINING ALCOHOL DO YOU HAVE ON A TYPICAL DAY: 1 OR 2
SKIP TO QUESTIONS 9-10: 1
HOW OFTEN DO YOU HAVE A DRINK CONTAINING ALCOHOL: MONTHLY OR LESS
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
## 2023-11-07 PROBLEM — Z91.199 NO-SHOW FOR APPOINTMENT: Status: ACTIVE | Noted: 2023-11-07

## 2023-11-13 LAB
ATRIAL RATE: 76 BPM
P AXIS: 22 DEGREES
P OFFSET: 199 MS
P ONSET: 144 MS
PR INTERVAL: 146 MS
Q ONSET: 217 MS
QRS COUNT: 12 BEATS
QRS DURATION: 90 MS
QT INTERVAL: 384 MS
QTC CALCULATION(BAZETT): 432 MS
QTC FREDERICIA: 415 MS
R AXIS: 40 DEGREES
T AXIS: 11 DEGREES
T OFFSET: 409 MS
VENTRICULAR RATE: 76 BPM

## 2023-11-28 ENCOUNTER — NUTRITION (OUTPATIENT)
Dept: SURGERY | Facility: CLINIC | Age: 23
End: 2023-11-28
Payer: COMMERCIAL

## 2023-11-28 VITALS — WEIGHT: 293 LBS | HEIGHT: 63 IN | BODY MASS INDEX: 51.91 KG/M2

## 2023-11-28 DIAGNOSIS — E66.01 MORBID OBESITY (MULTI): Primary | ICD-10-CM

## 2023-11-28 NOTE — PROGRESS NOTES
"PREOPERATIVE, MULTIDISCIPLINARY, MEDICALLY SUPERVISED, REDUCED CALORIE DIET, BEHAVIOR MODIFICATION AND EXERCISE PROGRAM    S:  Patient attended class today.    O:     Vitals:    11/28/23 1803   Weight: (!) 167 kg (368 lb)    Ht:   1.6 m (5' 3\")     BMI: Body mass index is 65.19 kg/m².    Goal: 5% body weight loss over the course of program    Dietary recommendation:   1. Eliminate high calorie, carbonated, and caffeinated beverages  2. Practice the 30-30-30 rule by drinking between meals.  3. Structure your meal plan - have 3 meals and 1 snack daily.  4. Have balanced meals that always contain a good source of protein.  5. Increase intake of non-starchy vegetables.  Have 5 servings fruits and vegetables daily.   6. Take a multivitamin daily.  7. Increase physical activity by 10-15 minutes to an end goal of 60 minutes 5 x per week.    Group Topic: Healthy Holiday Meal Planning    Behavioral recommendation: Patient is encouraged to choose healthy foods, along with ways to modify recipes as part of meal planning during the holiday season.     A/P: Pt appears to have a good understanding of how to incorporate health foods as part of holiday meal plans into her daily meal pattern.  Pt has a goal to consume Healthier choices as part of the holiday meal plans in their appropriate portion sizes.    Exercise: cardio 7x/wk for 30 min.    Aurora Montalvo RD, LD  "

## 2024-01-12 NOTE — PROGRESS NOTES
PREOPERATIVE, MULTIDISCIPLINARY, MEDICALLY SUPERVISED, REDUCED CALORIE DIET, BEHAVIOR MODIFICATION AND EXERCISE PROGRAM    S:  MSWL 3/6. Patient has lost 14 pounds since IV.  Feels she is doing well with meal plan, doing more meal prep. Less snacking, if she is hungry she will have a protein shake and/or fruit. Reports drinking 120+ oz water, some decaf green tea. Practicing postop behaviors. Exercise is consistent. Patient reports weight has been stalled for past 1.5 months. Recommended to use food journal to record intake each day and will review at our next visit. Patient has demonstrated appropriate lifestyle and diet modification to prepare her for surgery. Patient is cleared from nutrition standpoint at this time.       Diet recall:  B - fruit, x2 HB eggs, turkey sausage  Snack - 11AM - protein shake  L - tuna wrap  D - salmon bowls (cauliflower rice, vegetables)     O:    Wt: 362 lbs     Ht:     63 in       BMI: 64.13    Goal: 5% body weight loss over the course of program    Dietary recommendation:   1. Continue to drink >64oz water daily.   2. Practice the 30-30-30 rule by drinking between meals.  3. Continue to follow meal plan. Record what you eat daily into r food journal. We will review this next month.   4. Take a multivitamin daily.  5. Continue to exercise 1 our 5-7 days/week. Consistency is key!    Group Topic: Label Reading   Behavioral recommendation: Pt is encouraged to read labels regularly to identify the key information on food label ingredient lists; such as fats, carbohydrates, protein and serving sizes.    A/P: Pt appears to have a good understanding of how to read labels for important nutritional information. Pt has a goal to read labels at home or when purchasing food at the supermarket.    Exercise: [employer] workout videos 1 hour, daily, sometimes using 5# dumbbells     Geetha Hernandez RD

## 2024-01-15 ENCOUNTER — NUTRITION (OUTPATIENT)
Dept: SURGERY | Facility: HOSPITAL | Age: 24
End: 2024-01-15
Payer: MEDICARE

## 2024-01-15 VITALS — WEIGHT: 293 LBS | BODY MASS INDEX: 64.13 KG/M2

## 2024-01-15 DIAGNOSIS — Z98.84 BARIATRIC SURGERY STATUS: ICD-10-CM

## 2024-01-15 DIAGNOSIS — E66.01 MORBID OBESITY (MULTI): Primary | ICD-10-CM

## 2024-02-19 ENCOUNTER — NUTRITION (OUTPATIENT)
Dept: SURGERY | Facility: HOSPITAL | Age: 24
End: 2024-02-19
Payer: MEDICARE

## 2024-02-19 DIAGNOSIS — Z98.84 BARIATRIC SURGERY STATUS: Primary | ICD-10-CM

## 2024-02-19 DIAGNOSIS — E66.01 CLASS 3 SEVERE OBESITY WITH BODY MASS INDEX (BMI) OF 60.0 TO 69.9 IN ADULT, UNSPECIFIED OBESITY TYPE, UNSPECIFIED WHETHER SERIOUS COMORBIDITY PRESENT (MULTI): ICD-10-CM

## 2024-02-19 NOTE — PROGRESS NOTES
PREOPERATIVE, MULTIDISCIPLINARY, MEDICALLY SUPERVISED, REDUCED CALORIE DIET, BEHAVIOR MODIFICATION AND EXERCISE PROGRAM    S:  MSWL 4/6. Patient has lost 14 pounds since IV. Reports following meal plan, states she is doing well with it. Using food journal daily to record intake, finds that she is paying closer attention to her food choices and portions. Drinking mainly water and decaf green tea, fluid intake is 120oz/day. Practicing postop behaviors including 30-30-30 rule and small sips between meals. Taking MVI daily. Patient is consistent with exercise. Patient encouraged to continue with regimen.    Diet recall:  B - 3 plascencia, x3 HB eggs  Snack - handful grapes  L - tuna salad  D - broccoli/asparagus, turkey meatloaf      O:    Wt:  362 lbs    Ht:  63 in      BMI:     Goal: 5% body weight loss over the course of program    Dietary recommendation:   1. Continue to drink >64oz water daily.   2. Practice the 30-30-30 rule by drinking between meals.  3. Continue to follow meal plan and record your intake in the food journal daily.   4. Take a multivitamin daily.  5. Continue to exercise 1 our 5-7 days/week. Consistency is key!     Group Topic: Portion Control   Behavioral recommendation: Pt is encouraged to identify and use the appropriate serving sizes from each food group.    A/P: Pt appears to have a good understanding of how to incorporate appropriate portion sizes into their daily meal pattern.  Pt has a goal to begin weighing and measuring portions until able to visualize the appropriate portion size.    Exercise: 1 hour [employer] videos, sometimes with 5# dumbbells, 5-6 days/week     Geetha Hernandez RD

## 2024-03-12 ENCOUNTER — TELEPHONE (OUTPATIENT)
Dept: SURGERY | Facility: CLINIC | Age: 24
End: 2024-03-12
Payer: MEDICARE

## 2024-03-12 NOTE — TELEPHONE ENCOUNTER
Outbound call to patient to let her know that her insurance does not cover bariatric surgery.  First call placed was picked up but no one answered.  Second attempt went to voicemail.  Let a message letting patient know that unfortunately she does not have bariatric benefits with her new insurance plan.  Provided an estimate of self pay pricing and provided REAL Siu's number if the patient had questions/wanted to pursue self pay.  Drop for now.

## 2024-04-22 ENCOUNTER — APPOINTMENT (OUTPATIENT)
Dept: SURGERY | Facility: HOSPITAL | Age: 24
End: 2024-04-22
Payer: MEDICARE

## 2024-08-27 ENCOUNTER — APPOINTMENT (OUTPATIENT)
Dept: OBSTETRICS AND GYNECOLOGY | Facility: CLINIC | Age: 24
End: 2024-08-27

## 2024-08-30 ENCOUNTER — APPOINTMENT (OUTPATIENT)
Dept: OBSTETRICS AND GYNECOLOGY | Facility: CLINIC | Age: 24
End: 2024-08-30

## 2024-09-05 ENCOUNTER — APPOINTMENT (OUTPATIENT)
Dept: OBSTETRICS AND GYNECOLOGY | Facility: CLINIC | Age: 24
End: 2024-09-05
Payer: COMMERCIAL

## 2024-09-05 ENCOUNTER — LAB (OUTPATIENT)
Dept: LAB | Facility: LAB | Age: 24
End: 2024-09-05
Payer: COMMERCIAL

## 2024-09-05 VITALS
SYSTOLIC BLOOD PRESSURE: 134 MMHG | DIASTOLIC BLOOD PRESSURE: 72 MMHG | WEIGHT: 293 LBS | HEIGHT: 63 IN | BODY MASS INDEX: 51.91 KG/M2

## 2024-09-05 DIAGNOSIS — N89.8 VAGINAL DISCHARGE: ICD-10-CM

## 2024-09-05 DIAGNOSIS — E28.2 PCOS (POLYCYSTIC OVARIAN SYNDROME): ICD-10-CM

## 2024-09-05 DIAGNOSIS — Z01.419 ENCOUNTER FOR ANNUAL ROUTINE GYNECOLOGICAL EXAMINATION: Primary | ICD-10-CM

## 2024-09-05 DIAGNOSIS — Z80.3 FAMILY HISTORY OF BREAST CANCER: ICD-10-CM

## 2024-09-05 DIAGNOSIS — Z11.3 ROUTINE SCREENING FOR STI (SEXUALLY TRANSMITTED INFECTION): ICD-10-CM

## 2024-09-05 LAB
HBV SURFACE AG SERPL QL IA: NONREACTIVE
HIV 1+2 AB+HIV1 P24 AG SERPL QL IA: NONREACTIVE
TREPONEMA PALLIDUM IGG+IGM AB [PRESENCE] IN SERUM OR PLASMA BY IMMUNOASSAY: NONREACTIVE

## 2024-09-05 PROCEDURE — 99395 PREV VISIT EST AGE 18-39: CPT | Performed by: OBSTETRICS & GYNECOLOGY

## 2024-09-05 PROCEDURE — 1036F TOBACCO NON-USER: CPT | Performed by: OBSTETRICS & GYNECOLOGY

## 2024-09-05 PROCEDURE — 87491 CHLMYD TRACH DNA AMP PROBE: CPT

## 2024-09-05 PROCEDURE — 87661 TRICHOMONAS VAGINALIS AMPLIF: CPT

## 2024-09-05 PROCEDURE — 36415 COLL VENOUS BLD VENIPUNCTURE: CPT

## 2024-09-05 PROCEDURE — 3075F SYST BP GE 130 - 139MM HG: CPT | Performed by: OBSTETRICS & GYNECOLOGY

## 2024-09-05 PROCEDURE — 87591 N.GONORRHOEAE DNA AMP PROB: CPT

## 2024-09-05 PROCEDURE — 3008F BODY MASS INDEX DOCD: CPT | Performed by: OBSTETRICS & GYNECOLOGY

## 2024-09-05 PROCEDURE — 86780 TREPONEMA PALLIDUM: CPT

## 2024-09-05 PROCEDURE — 87389 HIV-1 AG W/HIV-1&-2 AB AG IA: CPT

## 2024-09-05 PROCEDURE — 87205 SMEAR GRAM STAIN: CPT

## 2024-09-05 PROCEDURE — 87340 HEPATITIS B SURFACE AG IA: CPT

## 2024-09-05 PROCEDURE — 3078F DIAST BP <80 MM HG: CPT | Performed by: OBSTETRICS & GYNECOLOGY

## 2024-09-05 NOTE — PROGRESS NOTES
Assessment     PLAN  1. Encounter for annual routine gynecological examination  - pap up to date    2. Family history of breast cancer  - discussed option for genetic counseling and potential testing for hereditary cancer syndrome and she desires to proceed  - Referral to Genetics; Future    3. Routine screening for STI (sexually transmitted infection)  - C. Trachomatis / N. Gonorrhoeae, Amplified Detection  - Trichomonas vaginalis, Nucleic Acid Detection  - HIV 1/2 Antigen/Antibody Screen with Reflex to Confirmation; Future  - Hepatitis B Surface Antigen; Future  - Syphilis Screen with Reflex; Future    4. Vaginal discharge  - treat based on results. Reports recurrent trichomonas. If treated for trichomonas then will have her return for test of cure.   - Vaginitis Gram Stain For Bacterial Vaginosis + Yeast    5. PCOS (polycystic ovarian syndrome)  - diagnosed last year based on oligomenorrhea and lab elevated testosterone. Currently her menses are mostly regular without treatment and she is not interested in treatment at this time.  She is in the process of having bariatric surgery.    Please return for your next visit in 1 year or sooner as needed.    Renetta Desai MD      Subjective     Nehemiah Ortiz is a 24 y.o. female who is here for a routine exam.   PCP = Geetha Robertson, VESNA-CNP    APE Concerns:   - c/o vaginal discharge and odor, some intermittent itching  - in the pre-op process for gastric bypass    Gynecologic History:    OBHx:   Menses: usually regular, occasionally skips  Last Pap: 2023: neg pap/HPV, due   HPV Vaccine: thinks completed series  History of Dysplasia: Denies  Sexually active: recent breakup with partner  STI testing: accepts all  Contraception: None, declines   Mammogram: NA  FamHx of GYN cancers:    - maternal grandma had breast cancer in her 20s   - maternal aunt passed away from breast cancer in her 40s      PMH, PSH, FH, SH, medications and allergies reviewed  "and edited as needed.      Objective   /72 (BP Location: Left arm, Patient Position: Sitting)   Ht 1.6 m (5' 3\")   Wt (!) 179 kg (395 lb 6.4 oz)   LMP 08/29/2024   BMI 70.04 kg/m²      General:   Alert and oriented, in no acute distress   Neck: Supple. No visible thyromegaly.    Breast/Axilla: Normal to palpation bilaterally without masses, skin changes, or nipple discharge.    Abdomen: Soft, non-tender, without masses or organomegaly   Vulva: Normal architecture without erythema, masses, or lesions.    Vagina: Normal mucosa without lesions, masses, or atrophy. No abnormal vaginal discharge.    Cervix: Difficult to visualize, no gross abnormalities   Uterus: Unable to palpate due to body  habitus   Adnexa: same   Pelvic Floor No POP noted. No high tone pelvic floor   Psych Normal affect. Normal mood.        "

## 2024-09-06 LAB
C TRACH RRNA SPEC QL NAA+PROBE: NEGATIVE
N GONORRHOEA DNA SPEC QL PROBE+SIG AMP: NEGATIVE
T VAGINALIS RRNA SPEC QL NAA+PROBE: NEGATIVE

## 2024-09-09 LAB
CLUE CELLS VAG LPF-#/AREA: PRESENT /[LPF]
NUGENT SCORE: 10
YEAST VAG WET PREP-#/AREA: ABNORMAL

## 2024-09-10 ENCOUNTER — TELEPHONE (OUTPATIENT)
Dept: OBSTETRICS AND GYNECOLOGY | Facility: CLINIC | Age: 24
End: 2024-09-10
Payer: COMMERCIAL

## 2024-09-10 DIAGNOSIS — N76.0 BV (BACTERIAL VAGINOSIS): Primary | ICD-10-CM

## 2024-09-10 DIAGNOSIS — B96.89 BV (BACTERIAL VAGINOSIS): Primary | ICD-10-CM

## 2024-09-10 RX ORDER — METRONIDAZOLE 500 MG/1
500 TABLET ORAL 2 TIMES DAILY
Qty: 14 TABLET | Refills: 0 | Status: SHIPPED | OUTPATIENT
Start: 2024-09-10 | End: 2024-09-17

## 2024-09-10 NOTE — TELEPHONE ENCOUNTER
Patient informed of results and that an antibiotic was sent in to the pharmacy. Patient has no questions or concerns at this time.  ----- Message from Renetta Desai sent at 9/10/2024 11:04 AM EDT -----  Please let her know she has BV. I sent Rx to flagyl to the pharmacy.

## 2024-09-24 ENCOUNTER — TELEPHONE (OUTPATIENT)
Dept: SURGERY | Facility: CLINIC | Age: 24
End: 2024-09-24
Payer: COMMERCIAL

## 2024-09-27 NOTE — PROGRESS NOTES
Initial Medical Weight Loss Appointment (MWL 1)     Starting Weight: 400 lbs   Current BMI: 70.86    Diet Experience: Nehemiah reports that this is her third attempt at starting the process to obtain bariatric surgery. She reports she initially started the program 3-4 years ago but then took a break due to personal matters. She then started the process again a year ago but reports that she was not ready due to self-discipline. She reports that she now feels fully ready and has been working on herself.   Diet wise, she reports that she lost 25 lbs while following a cabbage soup diet last December. She also lost 10 lbs at one point while following a keto diet. She reports that she saw weight regain when stopping both of these diets. She reports cutting out juice, pop, coffee, sweets and snacks within the last year. Pt reports that today's weight is her heaviest known weight.   Medical hx: Heartburn for the past 3 years.   Pt Seeking: RYGB   Pertinent Co-morbidities: none that are known  Current Daily Intake: mostly 2 meals/day   Breakfast- either skips, OR a homemade smoothie (frozen mixed fruit, juice, [employer] greek yogurt, ice)  Lunch- either a tuna wrap, OR a salad with tomatoes, olives, nuts, dried cranberries and either red wine vinaigrette/ [employer] red balsamic dressing. Pt reports she has been meal prepping more recently.   Dinner- mostly either airfried or baked chicken/salmon with broccoli/brussels sprouts and regular rice or cauliflower rice   How many times do you order takeout, fast food and/or go out to eat per week? 1x/month   Snacks: olives, fruit, dark chocolate covered almonds, honey roasted nuts, sunflower seeds   Beverages: water, sweetened green tea (pt estimates ~30oz daily)  Alcohol Intake: none  Food Allergies? Mustard   Food Intolerances? Milk, cheese, acidic foods that trigger heartburn  Food Dislikes? Spinach, fish (besides salmon)  Do you eat when you are not hungry and/or when you feel  full? No   Do you eat when you are bored/stressed/emotional? Pt reports she does the opposite and will not eat.   Current Exercise/Exercise Hx: walking 3x daily for 30 minutes each walk (total of 90 minutes daily)  Hours of sleep per night: roughly 4 hours; 9 am to 1 pm  Work schedule: 10 pm to ~8 am, 4-6 days per week including some weekends.   Who is in the household? Herself   Who does the cooking/grocery shopping? She does both   What do you want to get out of surgery? To gain more energy and overall be a healthier individual.   Motivation to change (1-10): motivated per pt.     Estimated ability to achieve goals: good.     Today's Lesson: Review of Dr. Reyes's lifelong rules, calorie counting, food label reading, promoting feelings of satiety, and energy balance.  Diet Goals: Practice the lifelong rules  Exercise Recommendations: Gradually work up to 150 minutes of moderate intensity exercise per week.  Behavior Changes: will be assessed every month  Behavior Goals:  1. Incorporate a breakfast meal.  2. Reduce liquid calories.   3. Measure out nuts/seeds/dried fruit to portion (1/4 cup).     Plan: Will follow up next month. Will continue to monitor pt's weight, dietary & behavior changes.        Aydee Berrios RD, LDN  Registered Dietitian, Licensed Dietitian Nutritionist

## 2024-10-01 ENCOUNTER — APPOINTMENT (OUTPATIENT)
Dept: SURGERY | Facility: CLINIC | Age: 24
End: 2024-10-01
Payer: COMMERCIAL

## 2024-10-01 VITALS
WEIGHT: 293 LBS | HEIGHT: 63 IN | HEART RATE: 90 BPM | RESPIRATION RATE: 16 BRPM | BODY MASS INDEX: 51.91 KG/M2 | SYSTOLIC BLOOD PRESSURE: 146 MMHG | DIASTOLIC BLOOD PRESSURE: 82 MMHG

## 2024-10-01 DIAGNOSIS — Z01.818 PRE-OP EVALUATION: ICD-10-CM

## 2024-10-01 DIAGNOSIS — R73.9 HYPERGLYCEMIA: ICD-10-CM

## 2024-10-01 DIAGNOSIS — D68.9 COAGULATION DISORDER (MULTI): Primary | ICD-10-CM

## 2024-10-01 DIAGNOSIS — D50.8 IRON DEFICIENCY ANEMIA SECONDARY TO INADEQUATE DIETARY IRON INTAKE: ICD-10-CM

## 2024-10-01 DIAGNOSIS — G47.33 OBSTRUCTIVE SLEEP APNEA: ICD-10-CM

## 2024-10-01 DIAGNOSIS — E61.0 COPPER DEFICIENCY: ICD-10-CM

## 2024-10-01 DIAGNOSIS — Z71.3 ENCOUNTER FOR NUTRITION EVALUATION PRIOR TO BARIATRIC SURGERY: ICD-10-CM

## 2024-10-01 DIAGNOSIS — E63.9 NUTRITIONAL DISORDER: ICD-10-CM

## 2024-10-01 DIAGNOSIS — E51.9 THIAMINE DEFICIENCY: ICD-10-CM

## 2024-10-01 DIAGNOSIS — E53.8 B12 DEFICIENCY: ICD-10-CM

## 2024-10-01 DIAGNOSIS — E07.9 DISEASE OF THYROID GLAND: ICD-10-CM

## 2024-10-01 DIAGNOSIS — E55.9 VITAMIN D DEFICIENCY: ICD-10-CM

## 2024-10-01 PROCEDURE — 99204 OFFICE O/P NEW MOD 45 MIN: CPT | Performed by: INTERNAL MEDICINE

## 2024-10-01 PROCEDURE — 3079F DIAST BP 80-89 MM HG: CPT | Performed by: INTERNAL MEDICINE

## 2024-10-01 PROCEDURE — 3008F BODY MASS INDEX DOCD: CPT | Performed by: INTERNAL MEDICINE

## 2024-10-01 PROCEDURE — 3077F SYST BP >= 140 MM HG: CPT | Performed by: INTERNAL MEDICINE

## 2024-10-01 PROCEDURE — 93000 ELECTROCARDIOGRAM COMPLETE: CPT | Performed by: INTERNAL MEDICINE

## 2024-10-01 ASSESSMENT — ENCOUNTER SYMPTOMS
DIFFICULTY URINATING: 0
BACK PAIN: 0
ABDOMINAL PAIN: 0
ARTHRALGIAS: 1
DEPRESSION: 0
CHILLS: 0
NAUSEA: 0
CONSTIPATION: 0
HEADACHES: 0
DIARRHEA: 0
SHORTNESS OF BREATH: 0
OCCASIONAL FEELINGS OF UNSTEADINESS: 0
WEAKNESS: 0
FREQUENCY: 0
COUGH: 0
NERVOUS/ANXIOUS: 0
DYSURIA: 0
LOSS OF SENSATION IN FEET: 0
ROS GI COMMENTS: HEARTBURN
FEVER: 0

## 2024-10-01 ASSESSMENT — PATIENT HEALTH QUESTIONNAIRE - PHQ9
1. LITTLE INTEREST OR PLEASURE IN DOING THINGS: NOT AT ALL
SUM OF ALL RESPONSES TO PHQ9 QUESTIONS 1 AND 2: 0
2. FEELING DOWN, DEPRESSED OR HOPELESS: NOT AT ALL

## 2024-10-01 ASSESSMENT — PAIN SCALES - GENERAL: PAINLEVEL: 0-NO PAIN

## 2024-10-01 NOTE — PROGRESS NOTES
"Subjective   Patient ID: Nehemiah Ortiz is a 24 y.o. female who presents for [facility] Visit 1.    Patient has been trying to lose weight for many years by following different diets like Weight Watchers, lost some weight but did not maintain. Currently has 2 meals a day. Usually skips breakfast but occasionally does smoothies. Lunch consists of tunafish and salad. For dinner, salmon, brussels sprouts and cauliflower rice. Snacks on almonds and peanuts. Drinks only water and green tea with sugar. Regarding exercise, she walks for about 30 minutes everyday with her dog. Denies any fhx of blood clots. Smokes mariajuana once a day but is trying to quit. Hasn't had a previous sleep study done. Reports having heavy periods.    Diagnostics Reviewed:10/1/2024 EKG NSR.   Labs Reviewed:           Review of Systems   Constitutional:  Negative for chills and fever.        Snoring, insomnia, poor sleep, and excessive daytime somnolence.     HENT:  Negative for dental problem.    Respiratory:  Negative for cough and shortness of breath.    Gastrointestinal:  Negative for abdominal pain, constipation, diarrhea and nausea.        Heartburn   Genitourinary:  Negative for difficulty urinating, dysuria and frequency.   Musculoskeletal:  Positive for arthralgias. Negative for back pain.   Neurological:  Negative for weakness and headaches.   Psychiatric/Behavioral:  The patient is not nervous/anxious.        Objective   /82   Pulse 90   Resp 16   Ht 1.6 m (5' 3\")   Wt (!) 181 kg (400 lb)   LMP 09/12/2024   BMI 70.86 kg/m²     Physical Exam  Constitutional:       General: She is not in acute distress.     Appearance: She is obese.   HENT:      Mouth/Throat:      Comments: Dentition WNL  Cardiovascular:      Rate and Rhythm: Normal rate and regular rhythm.      Heart sounds: Normal heart sounds.   Pulmonary:      Breath sounds: Normal breath sounds.   Abdominal:      Palpations: Abdomen is soft.      Tenderness: There is no " abdominal tenderness.   Musculoskeletal:      Cervical back: No tenderness.      Right lower leg: No edema.      Left lower leg: No edema.   Lymphadenopathy:      Cervical: No cervical adenopathy.   Skin:     General: Skin is warm.      Findings: No erythema.   Neurological:      Mental Status: She is alert and oriented to person, place, and time.      Gait: Gait is intact. Gait normal.   Psychiatric:         Mood and Affect: Mood normal.         Behavior: Behavior normal.       Assessment/Plan   Diagnoses and all orders for this visit:  Coagulation disorder (Multi)  -     Home sleep apnea test (HSAT); Future  -     CBC and Auto Differential; Future  -     aPTT; Future  -     Hemoglobin A1C; Future  -     Folate; Future  -     Ferritin; Future  -     Comprehensive Metabolic Panel; Future  -     TSH with reflex to Free T4 if abnormal; Future  -     Lipid Panel; Future  -     Protime-INR; Future  -     Vitamin D 25-Hydroxy,Total (for eval of Vitamin D levels); Future  -     Vitamin B12; Future  -     Parathyroid Hormone, Intact; Future  -     Vitamin B1, Whole Blood; Future  -     Copper, Blood; Future  -     Vitamin A; Future  -     Vitamin E; Future  -     Zinc, Serum or Plasma; Future  -     Iron and TIBC; Future  Pre-op evaluation  -     ECG 12 lead (Clinic Performed)  -     Home sleep apnea test (HSAT); Future  -     CBC and Auto Differential; Future  -     aPTT; Future  -     Hemoglobin A1C; Future  -     Folate; Future  -     Ferritin; Future  -     Comprehensive Metabolic Panel; Future  -     TSH with reflex to Free T4 if abnormal; Future  -     Lipid Panel; Future  -     Protime-INR; Future  -     Vitamin D 25-Hydroxy,Total (for eval of Vitamin D levels); Future  -     Vitamin B12; Future  -     Parathyroid Hormone, Intact; Future  -     Vitamin B1, Whole Blood; Future  -     Copper, Blood; Future  -     Vitamin A; Future  -     Vitamin E; Future  -     Zinc, Serum or Plasma; Future  -     Iron and TIBC;  Future  -     ECG 12 Lead  Copper deficiency  -     Home sleep apnea test (HSAT); Future  -     CBC and Auto Differential; Future  -     aPTT; Future  -     Hemoglobin A1C; Future  -     Folate; Future  -     Ferritin; Future  -     Comprehensive Metabolic Panel; Future  -     TSH with reflex to Free T4 if abnormal; Future  -     Lipid Panel; Future  -     Protime-INR; Future  -     Vitamin D 25-Hydroxy,Total (for eval of Vitamin D levels); Future  -     Vitamin B12; Future  -     Parathyroid Hormone, Intact; Future  -     Vitamin B1, Whole Blood; Future  -     Copper, Blood; Future  -     Vitamin A; Future  -     Vitamin E; Future  -     Zinc, Serum or Plasma; Future  -     Iron and TIBC; Future  Encounter for nutrition evaluation prior to bariatric surgery  -     Home sleep apnea test (HSAT); Future  -     CBC and Auto Differential; Future  -     aPTT; Future  -     Hemoglobin A1C; Future  -     Folate; Future  -     Ferritin; Future  -     Comprehensive Metabolic Panel; Future  -     TSH with reflex to Free T4 if abnormal; Future  -     Lipid Panel; Future  -     Protime-INR; Future  -     Vitamin D 25-Hydroxy,Total (for eval of Vitamin D levels); Future  -     Vitamin B12; Future  -     Parathyroid Hormone, Intact; Future  -     Vitamin B1, Whole Blood; Future  -     Copper, Blood; Future  -     Vitamin A; Future  -     Vitamin E; Future  -     Zinc, Serum or Plasma; Future  -     Iron and TIBC; Future  Hyperglycemia  -     Home sleep apnea test (HSAT); Future  -     CBC and Auto Differential; Future  -     aPTT; Future  -     Hemoglobin A1C; Future  -     Folate; Future  -     Ferritin; Future  -     Comprehensive Metabolic Panel; Future  -     TSH with reflex to Free T4 if abnormal; Future  -     Lipid Panel; Future  -     Protime-INR; Future  -     Vitamin D 25-Hydroxy,Total (for eval of Vitamin D levels); Future  -     Vitamin B12; Future  -     Parathyroid Hormone, Intact; Future  -     Vitamin B1, Whole  Blood; Future  -     Copper, Blood; Future  -     Vitamin A; Future  -     Vitamin E; Future  -     Zinc, Serum or Plasma; Future  -     Iron and TIBC; Future  B12 deficiency  -     Home sleep apnea test (HSAT); Future  -     CBC and Auto Differential; Future  -     aPTT; Future  -     Hemoglobin A1C; Future  -     Folate; Future  -     Ferritin; Future  -     Comprehensive Metabolic Panel; Future  -     TSH with reflex to Free T4 if abnormal; Future  -     Lipid Panel; Future  -     Protime-INR; Future  -     Vitamin D 25-Hydroxy,Total (for eval of Vitamin D levels); Future  -     Vitamin B12; Future  -     Parathyroid Hormone, Intact; Future  -     Vitamin B1, Whole Blood; Future  -     Copper, Blood; Future  -     Vitamin A; Future  -     Vitamin E; Future  -     Zinc, Serum or Plasma; Future  -     Iron and TIBC; Future  Disease of thyroid gland  -     Home sleep apnea test (HSAT); Future  -     CBC and Auto Differential; Future  -     aPTT; Future  -     Hemoglobin A1C; Future  -     Folate; Future  -     Ferritin; Future  -     Comprehensive Metabolic Panel; Future  -     TSH with reflex to Free T4 if abnormal; Future  -     Lipid Panel; Future  -     Protime-INR; Future  -     Vitamin D 25-Hydroxy,Total (for eval of Vitamin D levels); Future  -     Vitamin B12; Future  -     Parathyroid Hormone, Intact; Future  -     Vitamin B1, Whole Blood; Future  -     Copper, Blood; Future  -     Vitamin A; Future  -     Vitamin E; Future  -     Zinc, Serum or Plasma; Future  -     Iron and TIBC; Future  Iron deficiency anemia secondary to inadequate dietary iron intake  -     Home sleep apnea test (HSAT); Future  -     CBC and Auto Differential; Future  -     aPTT; Future  -     Hemoglobin A1C; Future  -     Folate; Future  -     Ferritin; Future  -     Comprehensive Metabolic Panel; Future  -     TSH with reflex to Free T4 if abnormal; Future  -     Lipid Panel; Future  -     Protime-INR; Future  -     Vitamin D  25-Hydroxy,Total (for eval of Vitamin D levels); Future  -     Vitamin B12; Future  -     Parathyroid Hormone, Intact; Future  -     Vitamin B1, Whole Blood; Future  -     Copper, Blood; Future  -     Vitamin A; Future  -     Vitamin E; Future  -     Zinc, Serum or Plasma; Future  -     Iron and TIBC; Future  Nutritional disorder  -     Home sleep apnea test (HSAT); Future  -     CBC and Auto Differential; Future  -     aPTT; Future  -     Hemoglobin A1C; Future  -     Folate; Future  -     Ferritin; Future  -     Comprehensive Metabolic Panel; Future  -     TSH with reflex to Free T4 if abnormal; Future  -     Lipid Panel; Future  -     Protime-INR; Future  -     Vitamin D 25-Hydroxy,Total (for eval of Vitamin D levels); Future  -     Vitamin B12; Future  -     Parathyroid Hormone, Intact; Future  -     Vitamin B1, Whole Blood; Future  -     Copper, Blood; Future  -     Vitamin A; Future  -     Vitamin E; Future  -     Zinc, Serum or Plasma; Future  -     Iron and TIBC; Future  Thiamine deficiency  -     Home sleep apnea test (HSAT); Future  -     CBC and Auto Differential; Future  -     aPTT; Future  -     Hemoglobin A1C; Future  -     Folate; Future  -     Ferritin; Future  -     Comprehensive Metabolic Panel; Future  -     TSH with reflex to Free T4 if abnormal; Future  -     Lipid Panel; Future  -     Protime-INR; Future  -     Vitamin D 25-Hydroxy,Total (for eval of Vitamin D levels); Future  -     Vitamin B12; Future  -     Parathyroid Hormone, Intact; Future  -     Vitamin B1, Whole Blood; Future  -     Copper, Blood; Future  -     Vitamin A; Future  -     Vitamin E; Future  -     Zinc, Serum or Plasma; Future  -     Iron and TIBC; Future  Vitamin D deficiency  -     Home sleep apnea test (HSAT); Future  -     CBC and Auto Differential; Future  -     aPTT; Future  -     Hemoglobin A1C; Future  -     Folate; Future  -     Ferritin; Future  -     Comprehensive Metabolic Panel; Future  -     TSH with reflex to  Free T4 if abnormal; Future  -     Lipid Panel; Future  -     Protime-INR; Future  -     Vitamin D 25-Hydroxy,Total (for eval of Vitamin D levels); Future  -     Vitamin B12; Future  -     Parathyroid Hormone, Intact; Future  -     Vitamin B1, Whole Blood; Future  -     Copper, Blood; Future  -     Vitamin A; Future  -     Vitamin E; Future  -     Zinc, Serum or Plasma; Future  -     Iron and TIBC; Future  Obstructive sleep apnea  -     Home sleep apnea test (HSAT); Future  -     CBC and Auto Differential; Future  -     aPTT; Future  -     Hemoglobin A1C; Future  -     Folate; Future  -     Ferritin; Future  -     Comprehensive Metabolic Panel; Future  -     TSH with reflex to Free T4 if abnormal; Future  -     Lipid Panel; Future  -     Protime-INR; Future  -     Vitamin D 25-Hydroxy,Total (for eval of Vitamin D levels); Future  -     Vitamin B12; Future  -     Parathyroid Hormone, Intact; Future  -     Vitamin B1, Whole Blood; Future  -     Copper, Blood; Future  -     Vitamin A; Future  -     Vitamin E; Future  -     Zinc, Serum or Plasma; Future  -     Iron and TIBC; Future      Scribe Attestation  By signing my name below, IErik Scribe   attest that this documentation has been prepared under the direction and in the presence of Dana Landry MD.

## 2024-10-03 NOTE — PROGRESS NOTES
Subjective   Patient ID: Nehemiah Ortiz is a 24 y.o. female who presents for No chief complaint on file..  HPI  BARIATRIC CONSULT  ON 10/01/2024 START Wt  (400 lb) HT: 63 IN IDEAL WT: 140 START EXCESS WT: 260 LB  YRS OF OBESITY: 20  GREATEST WT LOSS IN LBS: 10-15  PROGRAMS FOR WT LOSS IN THE PAST: WW, KETO, EXERCISE  TAKES ONE A DAY WOMAN'S MULTIVITAMIN (NEEDS ADDED)  WORKS as DSP   Patient uses weed  Review of Systems     Allergy/Immunologic:          HIV / AIDS No.  Hepatitis A No.  Hepatitis B No.  Hepatitis C No.  Immunosuppressent drugs No.         HEENT:          Headache negative.         CARDIOLOGY:          History of Hyperlipidemia No.  Last stress test N/A.  Last echocardiogram N/A.  Chest pain No.  High blood pressure YES  Irregular heart beat No.  Known coronary artery disease No.  Pacemaker No.  Palpitations No.         RESPIRATORY:          Hx steroid use No.  ER visits or Hospitalizations for breathing problems No.  Sleep Apnea No.  STONE (dyspnea on exertion) No.  Hx of Asthma/COPD No.         GASTROENTEROLOGY:          Peptic ulcer No, Last EGD N/A, Last UGI N/A.  Colonoscopy Last Colonoscopy N/A.  Heartburn YES.         ENDOCRINOLOGY:          Diabetes No.  Thyroid disorder No.         EXTREMITIES:          Varicose Veins No.  Stasis Ulcers No.  Ankle swelling No.  Personal history DVT No.  Personal history PE No.  Personal history of other thrombolic events No.  Family history of VTE No.  Known genetic bleeding or clotting disorder No.         FEMALE REPRODUCTIVE:          Uterine fibroids No.  Ovarian Cyst No.  Infertility No.  Menstrual history Menopausal.         UROLOGY:          Kidney disease No.  Kidney stones No.  Previous UTIs No.  Urinary incontinence No.         MUSCULOSKELETAL:          Osteoporosis/Osteopenia No.  Arthritis No.  Joint pain No.         SKIN:          Hidradenitis No.  Open skin wounds No.  Rosacea No.  Healing problems No.         PSYCHOLOGY:          Anxiety none.   Depression none.  Eating disorder denies.    Objective   Physical Exam    Assessment/Plan            Nury Carrington LPN 10/03/24 12:27 PM

## 2024-10-10 ENCOUNTER — APPOINTMENT (OUTPATIENT)
Dept: PRIMARY CARE | Facility: CLINIC | Age: 24
End: 2024-10-10
Payer: COMMERCIAL

## 2024-10-11 ENCOUNTER — APPOINTMENT (OUTPATIENT)
Dept: SURGERY | Facility: CLINIC | Age: 24
End: 2024-10-11
Payer: COMMERCIAL

## 2024-10-11 DIAGNOSIS — E66.01 MORBID (SEVERE) OBESITY DUE TO EXCESS CALORIES (MULTI): ICD-10-CM

## 2024-10-11 DIAGNOSIS — R40.0 DAYTIME SOMNOLENCE: ICD-10-CM

## 2024-10-11 DIAGNOSIS — Z91.89 AT RISK FOR DEEP VENOUS THROMBOSIS: ICD-10-CM

## 2024-10-11 DIAGNOSIS — E66.01 MORBID OBESITY WITH BODY MASS INDEX OF 70 AND OVER IN ADULT (MULTI): Primary | ICD-10-CM

## 2024-10-11 DIAGNOSIS — K21.9 GASTROESOPHAGEAL REFLUX DISEASE, UNSPECIFIED WHETHER ESOPHAGITIS PRESENT: ICD-10-CM

## 2024-10-11 PROCEDURE — 3077F SYST BP >= 140 MM HG: CPT | Performed by: SURGERY

## 2024-10-11 PROCEDURE — 99204 OFFICE O/P NEW MOD 45 MIN: CPT | Performed by: SURGERY

## 2024-10-11 PROCEDURE — 3078F DIAST BP <80 MM HG: CPT | Performed by: SURGERY

## 2024-10-11 PROCEDURE — 3008F BODY MASS INDEX DOCD: CPT | Performed by: SURGERY

## 2024-10-11 RX ORDER — OMEPRAZOLE 40 MG/1
40 CAPSULE, DELAYED RELEASE ORAL
Qty: 30 CAPSULE | Refills: 3 | Status: SHIPPED | OUTPATIENT
Start: 2024-10-11 | End: 2025-02-08

## 2024-10-11 RX ORDER — NALOXONE HYDROCHLORIDE 0.4 MG/ML
0.2 INJECTION, SOLUTION INTRAMUSCULAR; INTRAVENOUS; SUBCUTANEOUS EVERY 5 MIN PRN
Status: CANCELLED | OUTPATIENT
Start: 2024-10-11

## 2024-10-11 RX ORDER — FLUMAZENIL 0.1 MG/ML
0.2 INJECTION INTRAVENOUS ONCE AS NEEDED
Status: CANCELLED | OUTPATIENT
Start: 2024-10-11

## 2024-10-11 RX ORDER — ONDANSETRON HYDROCHLORIDE 2 MG/ML
4 INJECTION, SOLUTION INTRAVENOUS ONCE AS NEEDED
Status: CANCELLED | OUTPATIENT
Start: 2024-10-11

## 2024-10-11 ASSESSMENT — PATIENT HEALTH QUESTIONNAIRE - PHQ9
2. FEELING DOWN, DEPRESSED OR HOPELESS: NOT AT ALL
1. LITTLE INTEREST OR PLEASURE IN DOING THINGS: NOT AT ALL
SUM OF ALL RESPONSES TO PHQ9 QUESTIONS 1 AND 2: 0

## 2024-10-11 ASSESSMENT — PAIN SCALES - GENERAL: PAINLEVEL: 0-NO PAIN

## 2024-10-11 ASSESSMENT — COLUMBIA-SUICIDE SEVERITY RATING SCALE - C-SSRS
1. IN THE PAST MONTH, HAVE YOU WISHED YOU WERE DEAD OR WISHED YOU COULD GO TO SLEEP AND NOT WAKE UP?: NO
2. HAVE YOU ACTUALLY HAD ANY THOUGHTS OF KILLING YOURSELF?: NO
6. HAVE YOU EVER DONE ANYTHING, STARTED TO DO ANYTHING, OR PREPARED TO DO ANYTHING TO END YOUR LIFE?: NO

## 2024-10-11 NOTE — H&P
History Of Present Illness  Nehemiah Ortiz is a 24 y.o. female here for consultation for bariatric surgery. She suffers from morbid obesity and has been overweight for many years and has a number of weight related comorbidities. She hasattempted to lose weight several times over the years. She has had successes but has regained the weight at the completion of each of her dieting attempts. She is being referred for surgical intervention for her clinically significant morbid obesity.     -Bariatric Consultation:   ON 10/01/2024 START Wt  (400 lb) HT: 63 IN IDEAL WT: 140 START EXCESS WT: 260 LB  YRS OF OBESITY: 20  GREATEST WT LOSS IN LBS: 10-15  PROGRAMS FOR WT LOSS IN THE PAST: WW, KETO, EXERCISE.     Past Medical History  She has a past medical history of Asthma, Encounter for insertion of intrauterine contraceptive device (10/15/2018), Encounter for pregnancy test, result negative (10/15/2018), GERD (gastroesophageal reflux disease), Hypertension, Other conditions influencing health status, and Unspecified dislocation of unspecified patella, initial encounter (03/17/2014).    Surgical History  She has a past surgical history that includes Knee surgery (01/06/2014).     Social History  She reports that she has never smoked. She has never been exposed to tobacco smoke. She has never used smokeless tobacco. She reports that she does not currently use alcohol. She reports current drug use. Drug: Marijuana.    Family History  Family History   Problem Relation Name Age of Onset    Thyroid disease Mother      Other (MORBID OBESITY) Mother      Other (HTN) Mother      Hypertension Father      Other (MORBID OBESITY) Father      Asthma Other      Depression Other      Hypertension Other          Allergies  Patient has no known allergies.    Review of Systems   Allergy/Immunologic:          HIV / AIDS No.  Hepatitis A No.  Hepatitis B No.  Hepatitis C No.  Immunosuppressent drugs No.         HEENT:          Headache  negative.         CARDIOLOGY:          History of Hyperlipidemia No.  Last stress test N/A.  Last echocardiogram N/A.  Chest pain No.  High blood pressure YES  Irregular heart beat No.  Known coronary artery disease No.  Pacemaker No.  Palpitations No.         RESPIRATORY:          Hx steroid use No.  ER visits or Hospitalizations for breathing problems No.  Sleep Apnea No.  STONE (dyspnea on exertion) No.  Hx of Asthma/COPD No.         GASTROENTEROLOGY:          Peptic ulcer No, Last EGD N/A, Last UGI N/A.  Colonoscopy Last Colonoscopy N/A.  Heartburn YES.         ENDOCRINOLOGY:          Diabetes No.  Thyroid disorder No.         EXTREMITIES:          Varicose Veins No.  Stasis Ulcers No.  Ankle swelling No.  Personal history DVT No.  Personal history PE No.  Personal history of other thrombolic events No.  Family history of VTE No.  Known genetic bleeding or clotting disorder No.         FEMALE REPRODUCTIVE:          Uterine fibroids No.  Ovarian Cyst No.  Infertility No.  Menstrual history Menopausal.         UROLOGY:          Kidney disease No.  Kidney stones No.  Previous UTIs No.  Urinary incontinence No.         MUSCULOSKELETAL:          Osteoporosis/Osteopenia No.  Arthritis No.  Joint pain No.         SKIN:          Hidradenitis No.  Open skin wounds No.  Rosacea No.  Healing problems No.         PSYCHOLOGY:          Anxiety none.  Depression none.  Eating disorder denies.       Physical Exam       General Examination:         GENERAL APPEARANCE: alert and oriented x 3, Pleasant and cooperative, No Acute Distress.          HEENT: PERRLA.          NECK: no lymphadenopathy, no thyromegaly, no JVD, normal flexion, normal extension.          HEART: regular rate and rhythm.          LUNGS: clear to auscultation bilaterally.          CHEST: normal shape and expansion.          ABDOMEN: obese, hepatomegaly, no hernias present, soft and not tender, no guarding, no CVA tenderness.          EXTREMITIES: bilateral  "edema, no ulcerations.          SKIN: normal, no rash.          NEUROLOGIC EXAM: CN's II-XII grossly intact, gait normal.          BACK: no CVA tenderness.       Last Recorded Vitals  Blood pressure 156/79, pulse 76, height 1.6 m (5' 3\"), weight (!) 179 kg (395 lb), last menstrual period 09/12/2024.       Assessment/Plan   Problem List Items Addressed This Visit             ICD-10-CM    GERD (gastroesophageal reflux disease) K21.9    Relevant Medications    omeprazole (PriLOSEC) 40 mg DR capsule    Other Relevant Orders    Request for Pre-Admission Testing Visit    Morbid obesity with body mass index of 70 and over in adult (Multi) - Primary E66.01, Z68.45    Daytime somnolence R40.0    At risk for deep venous thrombosis Z91.89     Other Visit Diagnoses         Codes    Morbid (severe) obesity due to excess calories (Multi)     E66.01            We spent 45 minutes reviewing the three surgical options available in the treatment of morbid obesity in our practice. We reviewed the laparoscopic approach to the Jelani-en-Y gastric bypass, the vertical sleeve gastrectomy, and the adjustable gastric band. We reviewed the surgical technique, the risks, and my complication rates. We also reviewed the expected weight loss, the rules necessary for gayle-term success, the nutritional supplementation recommended for each operation, and the importance of incorporating excercise into the lifestyle to maintain the weight. We reviewed both the national history with each operation, my experience with each operation, the lack of long-term weight loss data with the vertical sleeve gastrectomy and the potential for exacerbating reflux with the vertical sleeve gastrectomy. In addition, we discussed the risk of adhesions, internal hernias, iron and calcium deficiency, dumping syndrome and potential for gastrojejunal ulcer with the RYGB. Nehemiah would like to proceed with RYGB.  We discussed preop weight loss to reduce liver volume.  She will " be discharged on lovenox in attempt to reduce perioperative risk of VTE.  She has a pending sleep study.  We will obtain preop EGD to evaluate her GERD symptoms.         Jann Reyes MD

## 2024-10-14 ENCOUNTER — LAB (OUTPATIENT)
Dept: LAB | Facility: LAB | Age: 24
End: 2024-10-14
Payer: COMMERCIAL

## 2024-10-14 ENCOUNTER — PRE-ADMISSION TESTING (OUTPATIENT)
Dept: PREADMISSION TESTING | Facility: HOSPITAL | Age: 24
End: 2024-10-14
Payer: COMMERCIAL

## 2024-10-14 VITALS
HEART RATE: 76 BPM | BODY MASS INDEX: 51.91 KG/M2 | DIASTOLIC BLOOD PRESSURE: 79 MMHG | HEIGHT: 63 IN | WEIGHT: 293 LBS | SYSTOLIC BLOOD PRESSURE: 156 MMHG

## 2024-10-14 VITALS
WEIGHT: 293 LBS | HEART RATE: 83 BPM | OXYGEN SATURATION: 98 % | HEIGHT: 63 IN | DIASTOLIC BLOOD PRESSURE: 93 MMHG | BODY MASS INDEX: 51.91 KG/M2 | TEMPERATURE: 98.6 F | SYSTOLIC BLOOD PRESSURE: 142 MMHG

## 2024-10-14 DIAGNOSIS — R73.9 HYPERGLYCEMIA: ICD-10-CM

## 2024-10-14 DIAGNOSIS — E51.9 THIAMINE DEFICIENCY: ICD-10-CM

## 2024-10-14 DIAGNOSIS — E07.9 DISEASE OF THYROID GLAND: ICD-10-CM

## 2024-10-14 DIAGNOSIS — Z01.818 PREOP TESTING: ICD-10-CM

## 2024-10-14 DIAGNOSIS — Z01.818 PREOP TESTING: Primary | ICD-10-CM

## 2024-10-14 DIAGNOSIS — E55.9 VITAMIN D DEFICIENCY: ICD-10-CM

## 2024-10-14 DIAGNOSIS — D50.8 IRON DEFICIENCY ANEMIA SECONDARY TO INADEQUATE DIETARY IRON INTAKE: ICD-10-CM

## 2024-10-14 DIAGNOSIS — Z71.3 ENCOUNTER FOR NUTRITION EVALUATION PRIOR TO BARIATRIC SURGERY: ICD-10-CM

## 2024-10-14 DIAGNOSIS — G47.33 OBSTRUCTIVE SLEEP APNEA: ICD-10-CM

## 2024-10-14 DIAGNOSIS — E53.8 B12 DEFICIENCY: ICD-10-CM

## 2024-10-14 DIAGNOSIS — E63.9 NUTRITIONAL DISORDER: ICD-10-CM

## 2024-10-14 DIAGNOSIS — E61.0 COPPER DEFICIENCY: ICD-10-CM

## 2024-10-14 DIAGNOSIS — D68.9 COAGULATION DISORDER (MULTI): ICD-10-CM

## 2024-10-14 DIAGNOSIS — Z01.818 PRE-OP EVALUATION: ICD-10-CM

## 2024-10-14 LAB
25(OH)D3 SERPL-MCNC: 10 NG/ML (ref 30–100)
ALBUMIN SERPL BCP-MCNC: 3.7 G/DL (ref 3.4–5)
ALP SERPL-CCNC: 53 U/L (ref 33–110)
ALT SERPL W P-5'-P-CCNC: 21 U/L (ref 7–45)
ANION GAP SERPL CALCULATED.3IONS-SCNC: 9 MMOL/L (ref 10–20)
APTT PPP: 28.8 SECONDS (ref 22–32.5)
AST SERPL W P-5'-P-CCNC: 15 U/L (ref 9–39)
BASOPHILS # BLD AUTO: 0.01 X10*3/UL (ref 0–0.1)
BASOPHILS NFR BLD AUTO: 0.2 %
BILIRUB SERPL-MCNC: 0.4 MG/DL (ref 0–1.2)
BUN SERPL-MCNC: 10 MG/DL (ref 6–23)
CALCIUM SERPL-MCNC: 8.7 MG/DL (ref 8.6–10.3)
CHLORIDE SERPL-SCNC: 108 MMOL/L (ref 98–107)
CHOLEST SERPL-MCNC: 196 MG/DL (ref 0–199)
CHOLEST/HDLC SERPL: 5.5 {RATIO}
CO2 SERPL-SCNC: 28 MMOL/L (ref 21–32)
CREAT SERPL-MCNC: 0.72 MG/DL (ref 0.5–1.05)
EGFRCR SERPLBLD CKD-EPI 2021: >90 ML/MIN/1.73M*2
EOSINOPHIL # BLD AUTO: 0.03 X10*3/UL (ref 0–0.7)
EOSINOPHIL NFR BLD AUTO: 0.5 %
ERYTHROCYTE [DISTWIDTH] IN BLOOD BY AUTOMATED COUNT: 13.3 % (ref 11.5–14.5)
EST. AVERAGE GLUCOSE BLD GHB EST-MCNC: 85 MG/DL
FERRITIN SERPL-MCNC: 40 NG/ML (ref 8–150)
FOLATE SERPL-MCNC: 10.4 NG/ML
GLUCOSE SERPL-MCNC: 92 MG/DL (ref 74–99)
HBA1C MFR BLD: 4.6 %
HCT VFR BLD AUTO: 37 % (ref 36–46)
HDLC SERPL-MCNC: 35.8 MG/DL
HGB BLD-MCNC: 12.1 G/DL (ref 12–16)
IMM GRANULOCYTES # BLD AUTO: 0.03 X10*3/UL (ref 0–0.7)
IMM GRANULOCYTES NFR BLD AUTO: 0.5 % (ref 0–0.9)
INR PPP: 1 (ref 0.9–1.2)
IRON SATN MFR SERPL: 29 % (ref 25–45)
IRON SERPL-MCNC: 82 UG/DL (ref 35–150)
LDLC SERPL CALC-MCNC: 142 MG/DL
LYMPHOCYTES # BLD AUTO: 2.23 X10*3/UL (ref 1.2–4.8)
LYMPHOCYTES NFR BLD AUTO: 37.6 %
MCH RBC QN AUTO: 29.6 PG (ref 26–34)
MCHC RBC AUTO-ENTMCNC: 32.7 G/DL (ref 32–36)
MCV RBC AUTO: 91 FL (ref 80–100)
MONOCYTES # BLD AUTO: 0.32 X10*3/UL (ref 0.1–1)
MONOCYTES NFR BLD AUTO: 5.4 %
NEUTROPHILS # BLD AUTO: 3.31 X10*3/UL (ref 1.2–7.7)
NEUTROPHILS NFR BLD AUTO: 55.8 %
NON HDL CHOLESTEROL: 160 MG/DL (ref 0–149)
NRBC BLD-RTO: 0 /100 WBCS (ref 0–0)
PLATELET # BLD AUTO: 347 X10*3/UL (ref 150–450)
POTASSIUM SERPL-SCNC: 3.9 MMOL/L (ref 3.5–5.3)
PROT SERPL-MCNC: 6.3 G/DL (ref 6.4–8.2)
PROTHROMBIN TIME: 10.6 SECONDS (ref 9.3–12.7)
PTH-INTACT SERPL-MCNC: 71.9 PG/ML (ref 18.5–88)
RBC # BLD AUTO: 4.09 X10*6/UL (ref 4–5.2)
SODIUM SERPL-SCNC: 141 MMOL/L (ref 136–145)
TIBC SERPL-MCNC: 286 UG/DL (ref 240–445)
TRIGL SERPL-MCNC: 93 MG/DL (ref 0–149)
TSH SERPL-ACNC: 1.48 MIU/L (ref 0.44–3.98)
UIBC SERPL-MCNC: 204 UG/DL (ref 110–370)
VIT B12 SERPL-MCNC: 323 PG/ML (ref 211–911)
VLDL: 19 MG/DL (ref 0–40)
WBC # BLD AUTO: 5.9 X10*3/UL (ref 4.4–11.3)

## 2024-10-14 PROCEDURE — 84590 ASSAY OF VITAMIN A: CPT

## 2024-10-14 PROCEDURE — 84425 ASSAY OF VITAMIN B-1: CPT

## 2024-10-14 PROCEDURE — 84443 ASSAY THYROID STIM HORMONE: CPT

## 2024-10-14 PROCEDURE — 82306 VITAMIN D 25 HYDROXY: CPT

## 2024-10-14 PROCEDURE — 82728 ASSAY OF FERRITIN: CPT

## 2024-10-14 PROCEDURE — 83540 ASSAY OF IRON: CPT

## 2024-10-14 PROCEDURE — 80061 LIPID PANEL: CPT

## 2024-10-14 PROCEDURE — 84630 ASSAY OF ZINC: CPT

## 2024-10-14 PROCEDURE — 85610 PROTHROMBIN TIME: CPT

## 2024-10-14 PROCEDURE — 83970 ASSAY OF PARATHORMONE: CPT

## 2024-10-14 PROCEDURE — 83036 HEMOGLOBIN GLYCOSYLATED A1C: CPT

## 2024-10-14 PROCEDURE — 82607 VITAMIN B-12: CPT

## 2024-10-14 PROCEDURE — 82525 ASSAY OF COPPER: CPT

## 2024-10-14 PROCEDURE — 99203 OFFICE O/P NEW LOW 30 MIN: CPT | Performed by: NURSE PRACTITIONER

## 2024-10-14 PROCEDURE — 84446 ASSAY OF VITAMIN E: CPT

## 2024-10-14 PROCEDURE — 85025 COMPLETE CBC W/AUTO DIFF WBC: CPT

## 2024-10-14 PROCEDURE — 85730 THROMBOPLASTIN TIME PARTIAL: CPT

## 2024-10-14 PROCEDURE — 80053 COMPREHEN METABOLIC PANEL: CPT

## 2024-10-14 PROCEDURE — 82746 ASSAY OF FOLIC ACID SERUM: CPT

## 2024-10-14 PROCEDURE — 83550 IRON BINDING TEST: CPT

## 2024-10-14 PROCEDURE — 36415 COLL VENOUS BLD VENIPUNCTURE: CPT

## 2024-10-14 RX ORDER — MULTIVIT-MIN/IRON FUM/FOLIC AC 7.5 MG-4
1 TABLET ORAL DAILY
COMMUNITY

## 2024-10-14 ASSESSMENT — DUKE ACTIVITY SCORE INDEX (DASI)
TOTAL_SCORE: 42.7
CAN YOU CLIMB A FLIGHT OF STAIRS OR WALK UP A HILL: YES
CAN YOU PARTICIPATE IN STRENOUS SPORTS LIKE SWIMMING, SINGLES TENNIS, FOOTBALL, BASKETBALL, OR SKIING: NO
DASI METS SCORE: 8
CAN YOU WALK A BLOCK OR TWO ON LEVEL GROUND: YES
CAN YOU HAVE SEXUAL RELATIONS: YES
CAN YOU DO HEAVY WORK AROUND THE HOUSE LIKE SCRUBBING FLOORS OR LIFTING AND MOVING HEAVY FURNITURE: YES
CAN YOU RUN A SHORT DISTANCE: NO
CAN YOU TAKE CARE OF YOURSELF (EAT, DRESS, BATHE, OR USE TOILET): YES
CAN YOU PARTICIPATE IN MODERATE RECREATIONAL ACTIVITIES LIKE GOLF, BOWLING, DANCING, DOUBLES TENNIS OR THROWING A BASEBALL OR FOOTBALL: YES
CAN YOU DO MODERATE WORK AROUND THE HOUSE LIKE VACUUMING, SWEEPING FLOORS OR CARRYING GROCERIES: YES
CAN YOU WALK INDOORS, SUCH AS AROUND YOUR HOUSE: YES
CAN YOU DO YARD WORK LIKE RAKING LEAVES, WEEDING OR PUSHING A MOWER: YES
CAN YOU DO LIGHT WORK AROUND THE HOUSE LIKE DUSTING OR WASHING DISHES: YES

## 2024-10-14 ASSESSMENT — ENCOUNTER SYMPTOMS
RESPIRATORY NEGATIVE: 1
CONSTITUTIONAL NEGATIVE: 1
NECK NEGATIVE: 1
NEUROLOGICAL NEGATIVE: 1
CARDIOVASCULAR NEGATIVE: 1
EYES NEGATIVE: 1
ENDOCRINE NEGATIVE: 1
MUSCULOSKELETAL NEGATIVE: 1
GASTROINTESTINAL NEGATIVE: 1

## 2024-10-14 ASSESSMENT — LIFESTYLE VARIABLES: SMOKING_STATUS: NONSMOKER

## 2024-10-14 ASSESSMENT — PAIN SCALES - GENERAL: PAINLEVEL_OUTOF10: 0 - NO PAIN

## 2024-10-14 ASSESSMENT — PAIN - FUNCTIONAL ASSESSMENT: PAIN_FUNCTIONAL_ASSESSMENT: 0-10

## 2024-10-15 RX ORDER — CHOLECALCIFEROL (VITAMIN D3) 1250 MCG
50000 TABLET ORAL
Qty: 12 TABLET | Refills: 3 | Status: SHIPPED | OUTPATIENT
Start: 2024-10-15 | End: 2025-10-15

## 2024-10-15 NOTE — RESULT ENCOUNTER NOTE
Labs okay except low vitamin D and low protein, needs to start vitamin D supplement and take with meal to improve absorption, I sent prescription.  Also needs to increase protein intake

## 2024-10-16 ENCOUNTER — PROCEDURE VISIT (OUTPATIENT)
Dept: SLEEP MEDICINE | Facility: HOSPITAL | Age: 24
End: 2024-10-16
Payer: COMMERCIAL

## 2024-10-16 ENCOUNTER — ANESTHESIA EVENT (OUTPATIENT)
Dept: GASTROENTEROLOGY | Facility: HOSPITAL | Age: 24
End: 2024-10-16
Payer: COMMERCIAL

## 2024-10-16 DIAGNOSIS — E53.8 B12 DEFICIENCY: ICD-10-CM

## 2024-10-16 DIAGNOSIS — E55.9 VITAMIN D DEFICIENCY: ICD-10-CM

## 2024-10-16 DIAGNOSIS — E63.9 NUTRITIONAL DISORDER: ICD-10-CM

## 2024-10-16 DIAGNOSIS — Z01.818 PRE-OP EVALUATION: ICD-10-CM

## 2024-10-16 DIAGNOSIS — G47.33 OBSTRUCTIVE SLEEP APNEA: ICD-10-CM

## 2024-10-16 DIAGNOSIS — E51.9 THIAMINE DEFICIENCY: ICD-10-CM

## 2024-10-16 DIAGNOSIS — Z71.3 ENCOUNTER FOR NUTRITION EVALUATION PRIOR TO BARIATRIC SURGERY: ICD-10-CM

## 2024-10-16 DIAGNOSIS — D68.9 COAGULATION DISORDER (MULTI): ICD-10-CM

## 2024-10-16 DIAGNOSIS — E61.0 COPPER DEFICIENCY: ICD-10-CM

## 2024-10-16 DIAGNOSIS — D50.8 IRON DEFICIENCY ANEMIA SECONDARY TO INADEQUATE DIETARY IRON INTAKE: ICD-10-CM

## 2024-10-16 DIAGNOSIS — R73.9 HYPERGLYCEMIA: ICD-10-CM

## 2024-10-16 DIAGNOSIS — E07.9 DISEASE OF THYROID GLAND: ICD-10-CM

## 2024-10-16 PROBLEM — R40.0 DAYTIME SOMNOLENCE: Status: ACTIVE | Noted: 2024-10-16

## 2024-10-16 PROBLEM — Z91.89 AT RISK FOR DEEP VENOUS THROMBOSIS: Status: ACTIVE | Noted: 2024-10-16

## 2024-10-16 PROBLEM — E66.01 MORBID OBESITY WITH BODY MASS INDEX OF 70 AND OVER IN ADULT (MULTI): Status: ACTIVE | Noted: 2024-10-16

## 2024-10-16 LAB
COPPER SERPL-MCNC: 143 UG/DL (ref 80–155)
ZINC SERPL-MCNC: 68.3 UG/DL (ref 60–120)

## 2024-10-16 PROCEDURE — 95806 SLEEP STUDY UNATT&RESP EFFT: CPT | Performed by: STUDENT IN AN ORGANIZED HEALTH CARE EDUCATION/TRAINING PROGRAM

## 2024-10-16 NOTE — LETTER
December 18, 2024    Nehemiah Ortiz  [id number]  [facility] [address]      Dear Ms. Ortiz:    ***    If you have any questions or concerns, please don't hesitate to call.    Sincerely,      We have been unable to contact you by phone regarding a follow up appointment.  Please call the office at *** so we can assist you in getting scheduled.        LOGAN SANDOVAL        CC: No Recipients

## 2024-10-17 ENCOUNTER — ANESTHESIA (OUTPATIENT)
Dept: GASTROENTEROLOGY | Facility: HOSPITAL | Age: 24
End: 2024-10-17
Payer: COMMERCIAL

## 2024-10-17 ENCOUNTER — HOSPITAL ENCOUNTER (OUTPATIENT)
Dept: GASTROENTEROLOGY | Facility: HOSPITAL | Age: 24
Discharge: HOME | End: 2024-10-17
Payer: COMMERCIAL

## 2024-10-17 VITALS
HEART RATE: 76 BPM | TEMPERATURE: 97 F | OXYGEN SATURATION: 100 % | DIASTOLIC BLOOD PRESSURE: 76 MMHG | RESPIRATION RATE: 18 BRPM | SYSTOLIC BLOOD PRESSURE: 134 MMHG

## 2024-10-17 DIAGNOSIS — K21.9 GASTRO-ESOPHAGEAL REFLUX DISEASE WITHOUT ESOPHAGITIS: Primary | ICD-10-CM

## 2024-10-17 PROBLEM — J45.909 ASTHMA: Status: ACTIVE | Noted: 2024-10-17

## 2024-10-17 LAB
A-TOCOPHEROL VIT E SERPL-MCNC: 6.5 MG/L (ref 5.5–18)
ANNOTATION COMMENT IMP: NORMAL
BETA+GAMMA TOCOPHEROL SERPL-MCNC: 2.4 MG/L (ref 0–6)
PREGNANCY TEST URINE, POC: NEGATIVE
RETINYL PALMITATE SERPL-MCNC: <0.02 MG/L (ref 0–0.1)
VIT A SERPL-MCNC: 0.38 MG/L (ref 0.3–1.2)

## 2024-10-17 PROCEDURE — 7100000002 HC RECOVERY ROOM TIME - EACH INCREMENTAL 1 MINUTE

## 2024-10-17 PROCEDURE — 43239 EGD BIOPSY SINGLE/MULTIPLE: CPT | Performed by: SURGERY

## 2024-10-17 PROCEDURE — 7100000010 HC PHASE TWO TIME - EACH INCREMENTAL 1 MINUTE

## 2024-10-17 PROCEDURE — 2500000004 HC RX 250 GENERAL PHARMACY W/ HCPCS (ALT 636 FOR OP/ED): Performed by: NURSE ANESTHETIST, CERTIFIED REGISTERED

## 2024-10-17 PROCEDURE — 3700000002 HC GENERAL ANESTHESIA TIME - EACH INCREMENTAL 1 MINUTE

## 2024-10-17 PROCEDURE — 7100000009 HC PHASE TWO TIME - INITIAL BASE CHARGE

## 2024-10-17 PROCEDURE — 3700000001 HC GENERAL ANESTHESIA TIME - INITIAL BASE CHARGE

## 2024-10-17 PROCEDURE — 81025 URINE PREGNANCY TEST: CPT

## 2024-10-17 PROCEDURE — 7100000001 HC RECOVERY ROOM TIME - INITIAL BASE CHARGE

## 2024-10-17 RX ORDER — ONDANSETRON HYDROCHLORIDE 2 MG/ML
4 INJECTION, SOLUTION INTRAVENOUS ONCE AS NEEDED
OUTPATIENT
Start: 2024-10-17

## 2024-10-17 RX ORDER — SODIUM CHLORIDE, SODIUM LACTATE, POTASSIUM CHLORIDE, CALCIUM CHLORIDE 600; 310; 30; 20 MG/100ML; MG/100ML; MG/100ML; MG/100ML
50 INJECTION, SOLUTION INTRAVENOUS CONTINUOUS
OUTPATIENT
Start: 2024-10-17 | End: 2024-10-17

## 2024-10-17 RX ORDER — SODIUM CHLORIDE, SODIUM LACTATE, POTASSIUM CHLORIDE, CALCIUM CHLORIDE 600; 310; 30; 20 MG/100ML; MG/100ML; MG/100ML; MG/100ML
INJECTION, SOLUTION INTRAVENOUS CONTINUOUS PRN
Status: DISCONTINUED | OUTPATIENT
Start: 2024-10-17 | End: 2024-10-17

## 2024-10-17 RX ORDER — MIDAZOLAM HYDROCHLORIDE 1 MG/ML
INJECTION, SOLUTION INTRAMUSCULAR; INTRAVENOUS AS NEEDED
Status: DISCONTINUED | OUTPATIENT
Start: 2024-10-17 | End: 2024-10-17

## 2024-10-17 RX ORDER — LIDOCAINE HYDROCHLORIDE 10 MG/ML
INJECTION, SOLUTION INFILTRATION; PERINEURAL AS NEEDED
Status: DISCONTINUED | OUTPATIENT
Start: 2024-10-17 | End: 2024-10-17

## 2024-10-17 RX ORDER — PROPOFOL 10 MG/ML
INJECTION, EMULSION INTRAVENOUS AS NEEDED
Status: DISCONTINUED | OUTPATIENT
Start: 2024-10-17 | End: 2024-10-17

## 2024-10-17 RX ORDER — ALBUTEROL SULFATE 0.83 MG/ML
2.5 SOLUTION RESPIRATORY (INHALATION) ONCE AS NEEDED
OUTPATIENT
Start: 2024-10-17

## 2024-10-17 SDOH — HEALTH STABILITY: MENTAL HEALTH: CURRENT SMOKER: 0

## 2024-10-17 ASSESSMENT — PAIN - FUNCTIONAL ASSESSMENT
PAIN_FUNCTIONAL_ASSESSMENT: 0-10

## 2024-10-17 ASSESSMENT — COLUMBIA-SUICIDE SEVERITY RATING SCALE - C-SSRS
2. HAVE YOU ACTUALLY HAD ANY THOUGHTS OF KILLING YOURSELF?: NO
6. HAVE YOU EVER DONE ANYTHING, STARTED TO DO ANYTHING, OR PREPARED TO DO ANYTHING TO END YOUR LIFE?: NO
1. IN THE PAST MONTH, HAVE YOU WISHED YOU WERE DEAD OR WISHED YOU COULD GO TO SLEEP AND NOT WAKE UP?: NO

## 2024-10-17 ASSESSMENT — PAIN SCALES - GENERAL
PAIN_LEVEL: 0
PAINLEVEL_OUTOF10: 0 - NO PAIN
PAINLEVEL_OUTOF10: 0 - NO PAIN
PAINLEVEL_OUTOF10: 1
PAINLEVEL_OUTOF10: 0 - NO PAIN

## 2024-10-17 NOTE — ANESTHESIA POSTPROCEDURE EVALUATION
Patient: Nehemiah Ortiz    Procedure Summary       Date: 10/17/24 Room / Location: [facility]    Anesthesia Start: 1314 Anesthesia Stop: 1336    Procedure: EGD Diagnosis:       Gastro-esophageal reflux disease without esophagitis      Gastro-esophageal reflux disease without esophagitis    Scheduled Providers: Jann Reyes MD; Vero Moreland MD; VESNA Lema-CRNA Responsible Provider: Vero Moreland MD    Anesthesia Type: MAC ASA Status: 3            Anesthesia Type: MAC    Vitals Value Taken Time   /87 10/17/24 1400   Temp 35.6 °C (96.1 °F) 10/17/24 1336   Pulse 71 10/17/24 1400   Resp 18 10/17/24 1400   SpO2 97 % 10/17/24 1400       Anesthesia Post Evaluation    Patient location during evaluation: PACU  Patient participation: complete - patient participated  Level of consciousness: awake and alert  Pain score: 0  Pain management: adequate  Multimodal analgesia pain management approach  Airway patency: patent  Two or more strategies used to mitigate risk of obstructive sleep apnea  Cardiovascular status: acceptable  Respiratory status: acceptable  Hydration status: acceptable  Postoperative Nausea and Vomiting: none    There were no known notable events for this encounter.

## 2024-10-17 NOTE — ANESTHESIA PREPROCEDURE EVALUATION
Patient: Nehemiah Ortiz    Procedure Information       Date/Time: 10/17/24 1325    Scheduled providers: Jann Reyes MD; Vero Moreland MD; GRACE Lema    Procedure: EGD    Location: [facility]            Relevant Problems   Cardiac   (+) Benign hypertension      Pulmonary   (+) Asthma      Neuro   (+) PTSD (post-traumatic stress disorder)      GI   (+) GERD (gastroesophageal reflux disease)      Endocrine   (+) Morbid obesity with body mass index of 70 and over in adult (Multi)      Mental Health   (+) History of depression     Past Surgical History:   Procedure Laterality Date   • KNEE SURGERY  01/06/2014    Knee Surgery       Clinical information reviewed:   Tobacco  Allergies  Meds   Med Hx  Surg Hx  OB Status  Fam Hx  Soc   Hx        NPO Detail:  NPO/Void Status  Carbohydrate Drink Given Prior to Surgery? : N  Date of Last Liquid: 10/16/24  Time of Last Liquid: 2000  Date of Last Solid: 10/16/24  Time of Last Solid: 2000  Last Intake Type: Clear fluids  Time of Last Void: 1229         Physical Exam    Airway  Mallampati: III  TM distance: >3 FB  Neck ROM: full     Cardiovascular    Dental    Pulmonary    Abdominal   (+) obese         Anesthesia Plan    History of general anesthesia?: yes  History of complications of general anesthesia?: no    ASA 3     MAC     The patient is not a current smoker.  Education provided regarding risk of obstructive sleep apnea.  intravenous induction   Anesthetic plan and risks discussed with patient.    Plan discussed with CRNA.

## 2024-10-17 NOTE — POST-PROCEDURE NOTE
PATIENT RECEIVED FROM PACU TO [facility] 14. DENIES ANY C/O DISCOMFORT. TOLERATING PO FLUIDS WELL. DISCHARGE INSTRUCTIONS REVIEWED WITH PATIENT.

## 2024-10-17 NOTE — DISCHARGE INSTRUCTIONS
Instructions  Patient Instructions after a  Upper GI,       The anesthetics, sedatives or narcotics which were given to you today will be acting in your body for the next 24 hours, so you might feel a little sleepy or groggy.  This feeling should slowly wear off. Carefully read and follow the instructions.      You received sedation today:  - Do not drive or operate any machinery or power tools of any kind.   - No alcoholic beverages today, not even beer or wine.  - Do not make any important decisions or sign any legal documents.  - No over the counter medications that contain alcohol or that may cause drowsiness.  - Do not make any important decisions or sign any legal documents.     While it is common to experience mild to moderate abdominal distention, gas, or belching after your procedure, if any of these symptoms occur following discharge from the GI Lab or within one week of having your procedure, call the [facility] to be advised whether a visit to your nearest Urgent Care or Emergency Department is indicated.  Take this paper with you if you go.      - If you develop an allergic reaction to the medications that were given during your procedure such as difficulty breathing, rash, hives, severe nausea, vomiting or lightheadedness.- If you experience chest pain, shortness of breath, severe abdominal pain, fevers and chills.     -If you develop signs and symptoms of bleeding such as blood in your spit, if your stools turn black, tarry, or bloody     - If you have not urinated within 8 hours following your procedure.- If your IV site becomes painful, red, inflamed, or looks infected.     If you received a biopsy/polypectomy/sphincterotomy the following instructions apply below:     - Do not use Aspirin containing products, non-steroidal medications or anti-coagulants for one week following your procedure. (Examples of these types of medications are: Advil, Arthrotec, Aleve, Coumadin, Ecotrin,  Heparin, Ibuprofen, Indocin, Motrin, Naprosyn, Nuprin, Plavix, Vioxx, and Voltarin, or their generic forms.  This list is not all-inclusive.  Check with your physician or pharmacist before resuming medications.)      - Eat a soft diet today.  Avoid foods that are poorly digested for the next 24 hours.  These foods would include: nuts, beans, lettuce, red meats, and fried foods.       - Start with liquids and advance your diet as tolerated, gradually work up to eating solids.      - Do not have a Barium Study or Enema for one week.     Your physician recommends the additional following instructions:          Upper GI endoscopy: Resume your previous diet, continue your medications, recommendation to repeat upper endoscopy in three months for follow up of Correia's ablation. Return to normal activity tomorrow.           If you experience any problems or have any questions following discharge from the GI Lab, please call:  Before 5p.m.  [phone]  After 5p.m.    [phone]     Nurse Signature                                                                        Date___________________                                                                            Patient/Responsible Party Signature                                        Date___________________

## 2024-10-18 LAB
LABORATORY COMMENT REPORT: NORMAL
PATH REPORT.FINAL DX SPEC: NORMAL
PATH REPORT.GROSS SPEC: NORMAL
PATH REPORT.RELEVANT HX SPEC: NORMAL
PATH REPORT.TOTAL CANCER: NORMAL
VIT B1 PYROPHOSHATE BLD-SCNC: 75 NMOL/L (ref 70–180)

## 2024-11-01 ENCOUNTER — APPOINTMENT (OUTPATIENT)
Dept: SURGERY | Facility: CLINIC | Age: 24
End: 2024-11-01
Payer: COMMERCIAL

## 2024-11-06 ENCOUNTER — APPOINTMENT (OUTPATIENT)
Dept: SURGERY | Facility: CLINIC | Age: 24
End: 2024-11-06
Payer: COMMERCIAL

## 2024-11-14 ENCOUNTER — APPOINTMENT (OUTPATIENT)
Dept: SURGERY | Facility: CLINIC | Age: 24
End: 2024-11-14
Payer: COMMERCIAL

## 2024-11-14 VITALS
BODY MASS INDEX: 51.91 KG/M2 | DIASTOLIC BLOOD PRESSURE: 89 MMHG | WEIGHT: 293 LBS | HEART RATE: 79 BPM | SYSTOLIC BLOOD PRESSURE: 146 MMHG | HEIGHT: 63 IN | RESPIRATION RATE: 16 BRPM

## 2024-11-14 DIAGNOSIS — K21.9 GASTROESOPHAGEAL REFLUX DISEASE WITHOUT ESOPHAGITIS: ICD-10-CM

## 2024-11-14 DIAGNOSIS — I10 PRIMARY HYPERTENSION: Primary | ICD-10-CM

## 2024-11-14 DIAGNOSIS — E66.01 MORBID (SEVERE) OBESITY DUE TO EXCESS CALORIES (MULTI): ICD-10-CM

## 2024-11-14 PROCEDURE — 99213 OFFICE O/P EST LOW 20 MIN: CPT | Performed by: INTERNAL MEDICINE

## 2024-11-14 PROCEDURE — 3079F DIAST BP 80-89 MM HG: CPT | Performed by: INTERNAL MEDICINE

## 2024-11-14 PROCEDURE — 3008F BODY MASS INDEX DOCD: CPT | Performed by: INTERNAL MEDICINE

## 2024-11-14 PROCEDURE — 3077F SYST BP >= 140 MM HG: CPT | Performed by: INTERNAL MEDICINE

## 2024-11-14 RX ORDER — TRIAMTERENE/HYDROCHLOROTHIAZID 37.5-25 MG
1 TABLET ORAL DAILY
Qty: 30 TABLET | Refills: 5 | Status: SHIPPED | OUTPATIENT
Start: 2024-11-14 | End: 2025-05-13

## 2024-11-14 ASSESSMENT — ENCOUNTER SYMPTOMS
SHORTNESS OF BREATH: 0
COUGH: 0
LOSS OF SENSATION IN FEET: 0
DEPRESSION: 0
OCCASIONAL FEELINGS OF UNSTEADINESS: 0
DIARRHEA: 0
ARTHRALGIAS: 0
ABDOMINAL PAIN: 0
PALPITATIONS: 0
CONSTIPATION: 0
DIZZINESS: 0
NAUSEA: 0

## 2024-11-14 ASSESSMENT — PAIN SCALES - GENERAL: PAINLEVEL_OUTOF10: 0-NO PAIN

## 2024-11-14 NOTE — PROGRESS NOTES
"Subjective   Patient ID: Nehemiah Ortiz is a 24 y.o. female who presents for [facility] Visit 2.    Currently has 3 meals a day and 20g of protein with each meal. Occasionally snacks on grapes. Drinks only water and non caffeinated sugar free tea. Regarding exercise, she goes to the gym 3x a week and walks on the treadmill everyday. Denies any fhx of blood clots. Smokes mariajuana once a day but is trying to quit. Takes a vitamin D supplement. Has cut back tremendously on sodium intake. Had a previous sleep study done but hasn't received results yet. Reports having heavy periods.    Diagnostics Reviewed:10/1/2024 EKG NSR.   Labs Reviewed: 10/14/2024 Blood work: total protein 6.3 and vitamin D 10.          Review of Systems   Respiratory:  Negative for cough and shortness of breath.    Cardiovascular:  Negative for chest pain and palpitations.   Gastrointestinal:  Negative for abdominal pain, constipation, diarrhea and nausea.   Musculoskeletal:  Negative for arthralgias.   Neurological:  Negative for dizziness.     Objective   /89   Pulse 79   Resp 16   Ht 1.6 m (5' 3\")   Wt (!) 180 kg (396 lb)   LMP 10/12/2024   BMI 70.15 kg/m²     Physical Exam  Cardiovascular:      Rate and Rhythm: Normal rate and regular rhythm.      Heart sounds: Normal heart sounds.   Pulmonary:      Breath sounds: Normal breath sounds.   Abdominal:      Palpations: Abdomen is soft. There is no hepatomegaly.      Tenderness: There is no abdominal tenderness.   Musculoskeletal:      Right lower leg: No edema.      Left lower leg: No edema.   Neurological:      Mental Status: She is alert and oriented to person, place, and time.      Gait: Gait normal.   Psychiatric:         Mood and Affect: Mood normal.         Behavior: Behavior normal.       Assessment/Plan   Diagnoses and all orders for this visit:  Primary hypertension  -     triamterene-hydrochlorothiazid (Maxzide-25) 37.5-25 mg tablet; Take 1 tablet by mouth once daily.  Morbid " (severe) obesity due to excess calories (Multi)  Gastroesophageal reflux disease without esophagitis      Scribe Attestation  By signing my name below, I, Mariam Green   attest that this documentation has been prepared under the direction and in the presence of Dana Landry MD.

## 2024-11-15 NOTE — PROGRESS NOTES
Medical Weight Loss Appointment (MWL 2)    Current Weight: 392 lbs / This reflects an 8 lb wt loss x 1 month.   Current BMI: 69.44     Current Diet: following most of the lifelong rules.   Adherence: good   Daily Intake: 3 meals/day   Breakfast- a smoothie (using [employer] prepackaged smoothie mix, added kale, Oikos 20g yogurt and water), OR 2 hard boiled eggs and turkey sausage   Lunch- turkey breast and cauliflower, or shrimp and brown rice, or a lettuce wrap with turkey, cheese and caesar dressing  Dinner- an Oikos greek yogurt topped with granola   Snacks: fruit (grapefruits, pomegranates), raw broccoli/peppers with Skinny girl dressing, cucumbers/tomatoes with balsamic vinegar   Beverages: just water   Exercise: recently obtained a  with goal of 3x/week. So far has been once. Workouts include using the treadmill, stair stepper, bike, and stretches. Pt also notes she recently bought an at-home treadmill   Behavior/Diet Changes:   - incorporated 3 meals/day   - eliminated all liquid calories   - started to meal prep lunch   Pt notes she began depo-provera medication.     Estimated ability to achieve goals: good.     Today's Lesson: Reviewed patient's dietary changes and food recall. Provided and reviewed a fruit handout. Reviewed total sugar goal = less than 10 grams per serving. Provided and reviewed a lean protein source handout.   Diet Goals: Practice the lifelong rules  Exercise Recommendations: Gradually work up to 150 minutes of moderate intensity exercise per week.  Behavior Goals:  1. Consistently incorporate 3 meals/day with at least 20 grams of protein/meal.   2. Reduce/limit sugar and carbohydrate intake.   3. Consistently incorporate exercise at least 2x/week.     Plan: Will follow up next month. Will continue to monitor pt's weight, dietary & behavior changes. Pt reports completing psych evaluation on 11/4 with Dr. Quijano.       Aydee Berrios RD, LDN  Registered Dietitian, Licensed  Dietitian Nutritionist

## 2024-11-19 ENCOUNTER — APPOINTMENT (OUTPATIENT)
Dept: SURGERY | Facility: CLINIC | Age: 24
End: 2024-11-19
Payer: COMMERCIAL

## 2024-11-19 VITALS — HEIGHT: 63 IN | BODY MASS INDEX: 51.91 KG/M2 | WEIGHT: 293 LBS

## 2024-12-17 ENCOUNTER — APPOINTMENT (OUTPATIENT)
Dept: SURGERY | Facility: CLINIC | Age: 24
End: 2024-12-17
Payer: COMMERCIAL

## 2025-01-03 ENCOUNTER — APPOINTMENT (OUTPATIENT)
Dept: SURGERY | Facility: CLINIC | Age: 25
End: 2025-01-03
Payer: COMMERCIAL

## 2025-01-09 ENCOUNTER — APPOINTMENT (OUTPATIENT)
Dept: SURGERY | Facility: CLINIC | Age: 25
End: 2025-01-09
Payer: COMMERCIAL

## 2025-01-09 VITALS
SYSTOLIC BLOOD PRESSURE: 147 MMHG | WEIGHT: 293 LBS | DIASTOLIC BLOOD PRESSURE: 84 MMHG | RESPIRATION RATE: 16 BRPM | HEART RATE: 84 BPM | HEIGHT: 63 IN | BODY MASS INDEX: 51.91 KG/M2

## 2025-01-09 DIAGNOSIS — G47.33 OBSTRUCTIVE SLEEP APNEA: ICD-10-CM

## 2025-01-09 DIAGNOSIS — E66.01 MORBID (SEVERE) OBESITY DUE TO EXCESS CALORIES (MULTI): ICD-10-CM

## 2025-01-09 DIAGNOSIS — K21.9 GASTROESOPHAGEAL REFLUX DISEASE WITHOUT ESOPHAGITIS: Primary | ICD-10-CM

## 2025-01-09 DIAGNOSIS — I10 PRIMARY HYPERTENSION: ICD-10-CM

## 2025-01-09 DIAGNOSIS — E55.9 VITAMIN D DEFICIENCY: ICD-10-CM

## 2025-01-09 PROCEDURE — 3008F BODY MASS INDEX DOCD: CPT | Performed by: INTERNAL MEDICINE

## 2025-01-09 PROCEDURE — 3079F DIAST BP 80-89 MM HG: CPT | Performed by: INTERNAL MEDICINE

## 2025-01-09 PROCEDURE — 99213 OFFICE O/P EST LOW 20 MIN: CPT | Performed by: INTERNAL MEDICINE

## 2025-01-09 PROCEDURE — 3077F SYST BP >= 140 MM HG: CPT | Performed by: INTERNAL MEDICINE

## 2025-01-09 ASSESSMENT — PAIN SCALES - GENERAL: PAINLEVEL_OUTOF10: 0-NO PAIN

## 2025-01-09 ASSESSMENT — ENCOUNTER SYMPTOMS
LOSS OF SENSATION IN FEET: 0
OCCASIONAL FEELINGS OF UNSTEADINESS: 0
DEPRESSION: 0

## 2025-01-09 NOTE — PROGRESS NOTES
Medical Weight Loss Appointment (MWL 3)    Current Weight: 394 lbs / noted 2 lb wt gain x 2 months.   Current BMI: 69.79  20 lb wt loss goal per Dr. Reyes (375 lbs)     Current Diet: practicing the lifelong rules   Adherence: good per pt.   Daily Intake: 3 meals/day   Breakfast- a Naked brand green machine smoothie with added scoop of protein powder and a greek yogurt   Lunch- mostly a salad topped with tuna/shrimp/chicken, either whole fruit like an orange/strawberries or 1 tbsp dried cranberries, sometimes 1 tbsp sunflower seeds, dairy-free cheese and Skinnygirl dressing    Dinner- mostly either chicken or salmon paired with 1-2 vegetables and vinaigrette   Snacks: none   Beverages: mostly water with sf packets or cucumbers/lemon/mint, diet cranberry recently, Naked brand green smoothie   Exercise: continued 2-3x/week with . Plus walking 6x/week for at least an hour daily.  Behavior/Diet Changes:  - completely eliminated bread per pt.   Nehemiah reports that she recently became sick to the point of vomiting with certain foods- citrus fruit, turkey, broccoli. She has changed her birth control recently but no other changes noted.     Estimated ability to achieve goals: good.     Today's Lesson: Reviewed patient's dietary changes and food recall. Reviewed pre-op requirements and wt loss goal per Dr. Reyes. I suggested that pt started to track via hand or tobias with the following goals as listed below. We reviewed the sugar content in the Naked smoothie (total of 53 grams) and better alternatives to ensure total sugar = 10 grams or less.   Diet Goals: Practice the lifelong rules  Exercise Recommendations: Gradually work up to 150 minutes of moderate intensity exercise per week.  Behavior Goals:  1. Begin to track with goals:   Calories: 7387-5374 daily   Protein: 115-140 grams daily  Carbohydrates: 150-190 grams daily   Fat: 50-60 grams daily     Plan: Will follow up next month. Will continue to  monitor pt's weight, dietary & behavior changes.       Aydee Berrios RD, LDN  Registered Dietitian, Licensed Dietitian Nutritionist

## 2025-01-09 NOTE — PROGRESS NOTES
"Subjective   Patient ID: Nehemiah Ortiz is a 24 y.o. female who presents for l 3.    Currently has 3 meals a day and 20g of protein with each meal. Lately, has been snacking more than usual. Snacks on raspberries , blueberries, apples with peanut butter and peppers. Drinks more water and non caffeinated sugar free tea. Regarding exercise, she goes to the gym 3x a week and walks on the treadmill for 45 minutes everyday. Denies any fhx of blood clots. Is no longer smoking [employer]. Reports having heavy periods. Obtained her CPAP, uses it every night and tolerates it well.     Diagnostics Reviewed:10/1/2024 EKG NSR.      12/16/2024 Sleep Study: revealed severe sleep apnea.   Labs Reviewed: 10/14/2024 Blood work: total protein 6.3 and vitamin D 10.              Review of Systems   Respiratory:  Negative for cough and shortness of breath.    Cardiovascular:  Negative for chest pain and palpitations.   Gastrointestinal:  Negative for abdominal pain, constipation, diarrhea and nausea.   Musculoskeletal:  Negative for arthralgias.   Neurological:  Negative for dizziness.       Objective   /84   Pulse 84   Resp 16   Ht 1.6 m (5' 3\")   Wt (!) 179 kg (394 lb)   LMP 12/13/2024   BMI 69.79 kg/m²     Physical Exam  Cardiovascular:      Rate and Rhythm: Normal rate and regular rhythm.      Heart sounds: Normal heart sounds.   Pulmonary:      Breath sounds: Normal breath sounds.   Abdominal:      Palpations: Abdomen is soft. There is no hepatomegaly.      Tenderness: There is no abdominal tenderness.   Musculoskeletal:      Right lower leg: No edema.      Left lower leg: No edema.   Neurological:      Mental Status: She is alert and oriented to person, place, and time.      Gait: Gait normal.   Psychiatric:         Mood and Affect: Mood normal.         Behavior: Behavior normal.         Assessment/Plan   Diagnoses and all orders for this visit:  Gastroesophageal reflux disease without esophagitis  Primary " hypertension  Morbid (severe) obesity due to excess calories (Multi)  Obstructive sleep apnea  Vitamin D deficiency    Discussed and provided recommendations for weight loss (nutrition choices, physical activity/exercise, intermittent fasting and/or available medical therapies). Time spent ~ 15minutes.

## 2025-01-26 ASSESSMENT — ENCOUNTER SYMPTOMS
PALPITATIONS: 0
DIARRHEA: 0
NAUSEA: 0
ABDOMINAL PAIN: 0
SHORTNESS OF BREATH: 0
COUGH: 0
DIZZINESS: 0
ARTHRALGIAS: 0
CONSTIPATION: 0

## 2025-02-05 ENCOUNTER — APPOINTMENT (OUTPATIENT)
Dept: SURGERY | Facility: CLINIC | Age: 25
End: 2025-02-05
Payer: COMMERCIAL

## 2025-02-07 ENCOUNTER — DOCUMENTATION (OUTPATIENT)
Dept: SURGERY | Facility: CLINIC | Age: 25
End: 2025-02-07
Payer: COMMERCIAL

## 2025-02-11 ENCOUNTER — TELEPHONE (OUTPATIENT)
Dept: SURGERY | Facility: CLINIC | Age: 25
End: 2025-02-11
Payer: COMMERCIAL

## 2025-02-18 ENCOUNTER — APPOINTMENT (OUTPATIENT)
Dept: SURGERY | Facility: CLINIC | Age: 25
End: 2025-02-18
Payer: COMMERCIAL

## 2025-03-03 NOTE — PROGRESS NOTES
Subjective     Date: 3/20/2025 Time: 10:46 AM  Name: Nehemiah Ortiz  MRN: [id number]    This is a 24 y.o. female with morbid obesity (Body mass index is 67.43 kg/m².) who presents to clinic for consideration of bariatric surgery. she has attempted and failed multiple diet and exercise regimens for weight loss. Initial Onset of obesity was in childhood.  Their goal for surgery is to  be healthier  and lose weight. The patient has tried multiple diets to lose weight including Low Carb, Low Calorie, and keto, calorie counting . The patient was most successful with the low calorie diet. The most pounds lost on this diet were 28 lbs. The patient considers their dietary weakness to be  staying on a consistent eating schedule, skips meals, and eats late at night  The patient reports a  highest weight ever of 450 pounds and lowest weight ever of 375 pounds Distribution of Obesity: is central. The patient exercises 3-4 times /week  60 Minutes/Day Types of Exercise : has a , currently focusing on cardio, water aerobics.     Comorbidities: anxiety, edema, esophageal reflux, infertility, knee paindoes not take NSAIDs, polycystic ovarian syndrome, sleep apnea using an appliance, and hypertension controlled with oral meds  Patient Active Problem List   Diagnosis    Bariatric surgery status    Benign hypertension    Breakthrough bleeding with IUD    Cervical spine pain    Dysmenorrhea    Frequent headaches    GERD (gastroesophageal reflux disease)    Irregular menses    Joint pain, knee    Lumbar spine pain    Lumbosacral strain    Menorrhagia    Morbid obesity (Multi)    Pain of left hand    Sleep disorder breathing    Strain of right shoulder    Strain of thoracic region    Trichimoniasis    Preop cardiovascular exam    PTSD (post-traumatic stress disorder)    History of depression    Problems related to inappropriate diet and eating habits    No-show for appointment    Morbid obesity with body mass index (BMI) of 60.0 to  69.9 in adult (Multi)    Daytime somnolence    At risk for deep venous thrombosis    Asthma       Jelani-en-Y Gastric Bypass    5 = Symptoms are incapacitation to do daily activity     PMH:   Past Medical History:   Diagnosis Date    Asthma     Encounter for insertion of intrauterine contraceptive device 10/15/2018    Encounter for IUD insertion    Encounter for pregnancy test, result negative 10/15/2018    Negative pregnancy test    GERD (gastroesophageal reflux disease)     Hypertension     Other conditions influencing health status     Denial Of Any Significant Medical History    Unspecified dislocation of unspecified patella, initial encounter 03/17/2014    Dislocation, patella closed        PSH:   Past Surgical History:   Procedure Laterality Date    KNEE SURGERY  01/06/2014    Knee Surgery          Denies personal/family hx of VTE.    FAMILY HISTORY:  Family History   Problem Relation Name Age of Onset    Thyroid disease Mother      Other (MORBID OBESITY) Mother      Other (HTN) Mother      Hypertension Father      Other (MORBID OBESITY) Father      Asthma Other      Depression Other      Hypertension Other          SOCIAL HISTORY:  Social History     Tobacco Use    Smoking status: Never     Passive exposure: Never    Smokeless tobacco: Never   Vaping Use    Vaping status: Former    Start date: 9/13/2021    Quit date: 9/9/2024    Substances: THC, CBD    Devices: Disposable, Pre-filled or refillable cartridge   Substance Use Topics    Alcohol use: Not Currently    Drug use: Not Currently     Types: Marijuana     Comment: last used 09/2024-quit       MEDICATIONS:  Prior to Admission Medications:  Medication Documentation Review Audit       Reviewed by Babak Laura MA (Medical Assistant) on 01/09/25 at 0944      Medication Order Taking? Sig Documenting Provider Last Dose Status   cholecalciferol (Vitamin D3) 1,250 mcg (50,000 unit) tablet [id number] Yes Take 1 tablet (50,000 Units) by mouth every 7 days. Dana  "MD Frantz  Active   multivitamin with minerals tablet [id number] Yes Take 1 tablet by mouth once daily. Historical Provider, MD Past Week Active   omeprazole (PriLOSEC) 40 mg DR capsule [id number] Yes Take 1 capsule (40 mg) by mouth once daily in the morning. Take before meals. Do not crush or chew. Jann Reyes MD 10/16/2024 Active   triamterene-hydrochlorothiazid (Maxzide-25) 37.5-25 mg tablet [id number] Yes Take 1 tablet by mouth once daily. Dana Landry MD  Active                     ALLERGIES:  No Known Allergies    REVIEW OF SYSTEMS:  GENERAL: Negative for malaise, significant weight loss and fever  HEAD: Negative for headache, swelling.  NECK: Negative for lumps, goiter, pain and significant neck swelling  RESPIRATORY: Negative for cough, wheezing or shortness of breath.  CARDIOVASCULAR: Negative for chest pain, leg swelling or palpitations.  GI: Negative for abdominal discomfort, blood in stools or black stools or change in bowel habits  : No history of dysuria, frequency or incontinence  MUSCULOSKELETAL: Negative for joint pain or swelling, back pain or muscle pain.  SKIN: Negative for lesions, rash, and itching.  PSYCH: Negative for sleep disturbance, mood disorder and recent psychosocial stressors.  ENDOCRINE: Negative for cold or heat intolerance, polyuria, polydipsia and goiter.    Objective   PHYSICAL EXAM:  Visit Vitals  BP (!) 152/101 (BP Location: Left arm, Patient Position: Sitting, BP Cuff Size: Adult) Comment (BP Location): forearm   Pulse 75   Ht 1.638 m (5' 4.5\")   Wt (!) 181 kg (399 lb)   SpO2 97%   BMI 67.43 kg/m²   OB Status Injection   Smoking Status Never   BSA 2.87 m²     General appearance: Generalized obesity, NAD  Neuro: AOx3  Head: EOMI; no swelling or lesions of scalp or face  ENT:  no lumps or lymphadenopathy, thyroid normal to palpation; oropharynx clear, no swelling or erythema  Skin: warm, no erythema or rashes  Lungs: clear   Heart: regular rhythm   Abdomen: soft, non-tender, "   Extremities: No gross edema.  Psych: no hurried speech, no flight of ideas, normal affect    Assessment/Plan   IMPRESSION:  Nehemiah Ortiz is a 24 y.o. female with a BMI of Body mass index is 67.43 kg/m². with the following diagnoses and co-morbidities:     Past Medical History:   Diagnosis Date    Asthma     Encounter for insertion of intrauterine contraceptive device 10/15/2018    Encounter for IUD insertion    Encounter for pregnancy test, result negative 10/15/2018    Negative pregnancy test    GERD (gastroesophageal reflux disease)     Hypertension     Other conditions influencing health status     Denial Of Any Significant Medical History    Unspecified dislocation of unspecified patella, initial encounter 03/17/2014    Dislocation, patella closed       This patient does meet the criteria for a surgical weight loss procedure according to NIH guidelines.    She has been seen by Dr. Colby Rowan in the past and was going through the process of getting medical clearances for gastric bypass surgery.    The risks of Jelani-en-Y gastric bypass surgery including but not limited to bleeding, leak along staple lines, infection, dehydration, ulcers, internal hernia, DVT/PE, pneumonia, myocardial infarction, prolonged nausea/vomiting, incomplete resolution of associated medical conditions, reflux, weight regain, vitamin/mineral deficiencies, and death have been explained to the patient and Nehemiah Ortiz has expressed understanding and acceptance of them.     We discussed the lifestyle changes necessary to be successful following surgery.    The increased risk of substance and alcohol abuse following bariatric surgery was discussed with the patient, along with the negative consequences of substance/alcohol use after surgery including addiction, worsening of mental health disorders, and injury to the stomach. The risk of smoking and vaping (tobacco or any other substance) after bariatric surgery was explained to the  patient. This includes risk of anastamotic ulcers, gastritis, bleeding, perforation, stricture, and PO intolerance.  The patient expressed understanding and acceptance of these risks.    The patient was advised not to become pregnant within 12-18 months following bariatric surgery. She was educated on the increased risks to mother and fetus associated with pregnancy within 2 years of bariatric surgery.    The possible benefits of the above surgeries including weight loss, improvement/resolution of associated medical and mental health conditions, improved mobility, and decreased mortality have been explained the the patient and Nehemiah Ortiz has expressed understanding and acceptance of them.      PLAN:  The plan of treatment for Nehemiah Ortiz is to continue with the consultations and tests ordered today in hopes of qualifying for pre-operative clearance for bariatric surgery. This includes:    Consult Nutrition for education  Consult Psychology  Consult Cardiology  Labs ordered - Pending nicotine and drug screen, recheck vitamin d.  All other labs complete 10/14/24  EGD - complete 10/17/24  PCP for medical optimization  CPAP compliance - sleep study 12/16/24 showing severe BRIANDA   Recommend 20-40 lbs of weight loss prior to surgery.

## 2025-03-20 ENCOUNTER — NUTRITION (OUTPATIENT)
Dept: SURGERY | Facility: CLINIC | Age: 25
End: 2025-03-20
Payer: COMMERCIAL

## 2025-03-20 ENCOUNTER — OFFICE VISIT (OUTPATIENT)
Dept: SURGERY | Facility: CLINIC | Age: 25
End: 2025-03-20
Payer: COMMERCIAL

## 2025-03-20 VITALS
BODY MASS INDEX: 48.82 KG/M2 | SYSTOLIC BLOOD PRESSURE: 152 MMHG | HEART RATE: 75 BPM | DIASTOLIC BLOOD PRESSURE: 101 MMHG | OXYGEN SATURATION: 97 % | WEIGHT: 293 LBS | HEIGHT: 65 IN

## 2025-03-20 DIAGNOSIS — G47.33 OBSTRUCTIVE SLEEP APNEA: ICD-10-CM

## 2025-03-20 DIAGNOSIS — E66.01 MORBID OBESITY WITH BODY MASS INDEX (BMI) OF 60.0 TO 69.9 IN ADULT (MULTI): Primary | ICD-10-CM

## 2025-03-20 DIAGNOSIS — K21.9 GASTROESOPHAGEAL REFLUX DISEASE WITHOUT ESOPHAGITIS: ICD-10-CM

## 2025-03-20 DIAGNOSIS — Z13.21 ENCOUNTER FOR VITAMIN DEFICIENCY SCREENING: ICD-10-CM

## 2025-03-20 DIAGNOSIS — I10 PRIMARY HYPERTENSION: ICD-10-CM

## 2025-03-20 DIAGNOSIS — Z71.3 ENCOUNTER FOR NUTRITION EVALUATION PRIOR TO BARIATRIC SURGERY: ICD-10-CM

## 2025-03-20 DIAGNOSIS — E55.9 VITAMIN D DEFICIENCY: ICD-10-CM

## 2025-03-20 DIAGNOSIS — Z98.84 BARIATRIC SURGERY STATUS: ICD-10-CM

## 2025-03-20 PROCEDURE — 99214 OFFICE O/P EST MOD 30 MIN: CPT | Performed by: SURGERY

## 2025-03-20 PROCEDURE — 3008F BODY MASS INDEX DOCD: CPT | Performed by: SURGERY

## 2025-03-20 PROCEDURE — 3080F DIAST BP >= 90 MM HG: CPT | Performed by: SURGERY

## 2025-03-20 PROCEDURE — 3077F SYST BP >= 140 MM HG: CPT | Performed by: SURGERY

## 2025-03-20 PROCEDURE — 1036F TOBACCO NON-USER: CPT | Performed by: SURGERY

## 2025-03-20 NOTE — PROGRESS NOTES
Surgeon: Yaima  Patient is considering: Gastric bypass     ASSESSMENT:  Current weight: 399.0  Ht: 64.5  in   BMI:  67.4        Initial start weight:   399.0   Pre-Op Excess Body Weight (EBW):   254.0  Target Post-Op weight goal:     195.8-246.6    Food allergies/intolerances:  lactose intolerant  Chewing/Swallowing/Dentition:   none  Diarrhea/ Constipation:   none  Exercise level:  works with a  for 60 min 3-4x/wee  Smoking/Tobacco use:   none  Vitamins/Minerals supplements:   MVI  Past diet attempts:   exercise/, vegan, cabbage soup, low CHO, keto, liquid diet, PWLC, RD monitored, MD monitored  Hours of sleep/night:   6-8     24 HOUR RECALL/DIET HISTORY:  Breakfast:  yogurt and granola  Snack:    apples and almond butter  Lunch:   salad or chicken wrap  Snack:   grapefruit, watermelon, banana, grapes  Dinner:   veggies, protein bowl   Snack:   none  Beverages: 8 oz non dairy milk/day, 8-16 oz fresh juice/day, 1-2 gallons of water/day  Alcohol: none    Person responsible for cooking & shopping?  Pt does both  How often do you eat sweet snacks?    1x/wee  How often do you eat savory snacks?   none  How often do you eat out?     1-2x/month  Do you feel overly stuffed?   non  Binge Eating?   no  Night Eating?   no  Emotional Eating?  no         READINESS TO LEARN:  Motivation to learn: Interested        Understanding of instruction: Good   Anticipated Compliance: Good   Family Support: Unable to assess-family not present          Educational Materials Provided:    Schedules for MSWL class and support group   Calorie meal plan   Goals sheet    Nutrition assessment completed today.  Pt will be scheduled for a video education class at a later date to discuss the 2 week pre op diet, post op protein and fluid goals, vitamin and mineral supplementation, exercise goals, and post op diet progression.     Patient is seeking gastric bypass  sleeve gastrectomy  surgery    Pt works  full time. She works 10  pm to 7 am.    Pt started the process for WLS at least 2 years ago.   She states she just wasn't ready to proceed with the process then but is very motivated now.       Recommend following  1500 calorie meal plan.  Recommend eating 3 meals that include 3-4 oz protein and 2 high protein  snacks. Discussed packing meals and snacks for work and went over a schedule of meals and snacks.     Reviewed the postop behaviors to start practicing.  Pt will continue to exercise.     Patient was receptive to nutritional recommendations, asked numerous questions, and verbalized understanding of the weight loss surgery diet.  Patient expressed understanding about the importance of strict dietary compliance post-surgery to avoid nutritional deficiencies and achieve optimal weight loss and verbalized intent to follow dietary recommendations.    Malnutrition Screening:  Significant unintentional weight loss? n/a   Eating less than 75% of usual intake for more than 2 weeks? n/a      Nutrition Diagnosis:   1. Overweight/obesity related to excess energy intake as evidenced by BMI = 40 kg/m^2.  2. Food- and nutrition-related knowledge deficit related to lack of prior exposure to surgical weight loss information as evidenced by pt new to surgical program.    Nutrition Interventions:   1. Modify type and amount of food and nutrients within meals and snacks.  2. Comprehensive Nutrition Education    Recommendations:  1. Begin following your meal plan.  Measure and record intake daily.   2. Structure meal patterns, eating three meals and 1-2 snacks per day.  3. Aim for 3-4 oz protein per meal.  Have 2 high protein snacks that are 10-20 g protein each.  You can try a tuna or chicken packet, Greek yogurt, 2 string cheeses, Protein bars like Quest, Pure Protein, Premier, or Built Bars. you can also try protein chips form [employer] or Atkins.    4. Stop drinking juice. Drink 64oz of calorie-free, caffeine-free, and non-carbonated beverages.    5. Practice no drinking 30 minutes before meals, nothing with meals and wait 30 minutes after meals to drink again. Make meals last at least 20-30 minutes-chew thoroughly.   6. Limit or omit eating out/sweets/savory snacks to 1-2 times per week.  7. Begin daily multivitamin.  You can try Centrum Adult tablet.   8. Increase physical activity by 10-15 minutes as tolerated to an end goal of 60 minutes 5 x per week. Consistency is the key.  9. Attend monthly Zoom bariatric support groups.      Pre-op Goal weight: lose 5% of body weight    Nutrition Monitoring and Evaluation: 1-2 pound weight loss per week  Criteria: weight check  Need for Follow-up:     Patient does meet National Institutes Health guidelines for weight loss surgery, however needs to demonstrate consistent effort in making dietary changes before giving clearance. It is anticipated that the patient will need at least 2 nutritional follow-up visits prior to clearance for surgery.    Shae Esparza RD, LD, [facility]  [phone]

## 2025-04-04 ENCOUNTER — OFFICE VISIT (OUTPATIENT)
Dept: CARDIOLOGY | Facility: CLINIC | Age: 25
End: 2025-04-04
Payer: COMMERCIAL

## 2025-04-04 VITALS
BODY MASS INDEX: 51.91 KG/M2 | HEART RATE: 63 BPM | SYSTOLIC BLOOD PRESSURE: 124 MMHG | DIASTOLIC BLOOD PRESSURE: 72 MMHG | OXYGEN SATURATION: 98 % | HEIGHT: 63 IN | WEIGHT: 293 LBS

## 2025-04-04 DIAGNOSIS — Z01.810 PREOP CARDIOVASCULAR EXAM: Primary | ICD-10-CM

## 2025-04-04 DIAGNOSIS — R06.09 DOE (DYSPNEA ON EXERTION): ICD-10-CM

## 2025-04-04 DIAGNOSIS — Z98.84 BARIATRIC SURGERY STATUS: ICD-10-CM

## 2025-04-04 DIAGNOSIS — E66.01 MORBID OBESITY WITH BODY MASS INDEX (BMI) OF 60.0 TO 69.9 IN ADULT (MULTI): ICD-10-CM

## 2025-04-04 PROCEDURE — 99214 OFFICE O/P EST MOD 30 MIN: CPT | Performed by: STUDENT IN AN ORGANIZED HEALTH CARE EDUCATION/TRAINING PROGRAM

## 2025-04-04 PROCEDURE — 3078F DIAST BP <80 MM HG: CPT | Performed by: STUDENT IN AN ORGANIZED HEALTH CARE EDUCATION/TRAINING PROGRAM

## 2025-04-04 PROCEDURE — 3008F BODY MASS INDEX DOCD: CPT | Performed by: STUDENT IN AN ORGANIZED HEALTH CARE EDUCATION/TRAINING PROGRAM

## 2025-04-04 PROCEDURE — 3074F SYST BP LT 130 MM HG: CPT | Performed by: STUDENT IN AN ORGANIZED HEALTH CARE EDUCATION/TRAINING PROGRAM

## 2025-04-04 PROCEDURE — 99204 OFFICE O/P NEW MOD 45 MIN: CPT | Performed by: STUDENT IN AN ORGANIZED HEALTH CARE EDUCATION/TRAINING PROGRAM

## 2025-04-04 PROCEDURE — 1036F TOBACCO NON-USER: CPT | Performed by: STUDENT IN AN ORGANIZED HEALTH CARE EDUCATION/TRAINING PROGRAM

## 2025-04-04 NOTE — PATIENT INSTRUCTIONS
We will obtain a transthoracic echocardiogram for structural evaluation including ejection fraction, assessment of regional wall motion abnormalities or valvular disease, and further evaluation of hemodynamics.    Please followup with me in Cardiology clinic as needed.  Please return to clinic sooner or seek emergent care if your symptoms reoccur or worsen.

## 2025-04-04 NOTE — PROGRESS NOTES
Referred by Dr. Erwin for No chief complaint on file.     HPI:    Nehemiah Ortiz is a 24 y.o. female with pertinent history of hypertension, prior history of tobacco abuse, morbid obesity, GERD, PTSD, osteoarthritis presents to cardiology clinic to establish care.     She is planning undergo bariatric surgery.  She is working on weight loss.  She was able to quit smoking about 6 months ago.  She notes mild relatively stable dyspnea on exertion.  She is working with a  to increase her exercise capacity.  She complete greater than 6 METS of activity without clear anginal equivalent.  No exacerbating or relieving factors.  Patient denies chest pain and angina.  Pt denies orthopnea, and paroxysmal nocturnal dyspnea.  Pt denies worsening lower extremity edema.  Pt denies palpitations or syncope.  No recent falls.  No fever or chills.  No cough.  No change in bowel or bladder habits.  No sick contacts.  No recent travel.    12 point review of systems including (Constitutional, Eyes, ENMT, Respiratory, Cardiac, Gastrointestinal, Neurological, Psychiatric, and Hematologic) was performed and is otherwise negative.    Past medical history reviewed:   has a past medical history of Asthma, Encounter for insertion of intrauterine contraceptive device (10/15/2018), Encounter for pregnancy test, result negative (10/15/2018), GERD (gastroesophageal reflux disease), Hypertension, Other conditions influencing health status, and Unspecified dislocation of unspecified patella, initial encounter (03/17/2014).    Past surgical history reviewed:   has a past surgical history that includes Knee surgery (01/06/2014).    Social history reviewed:   reports that she has never smoked. She has never been exposed to tobacco smoke. She has never used smokeless tobacco. She reports that she does not currently use alcohol. She reports that she does not currently use drugs after having used the following drugs: Marijuana.     Family history  reviewed:    Family History   Problem Relation Name Age of Onset    Thyroid disease Mother      Other (MORBID OBESITY) Mother      Other (HTN) Mother      Hypertension Father      Other (MORBID OBESITY) Father      Asthma Other      Depression Other      Hypertension Other         Allergies reviewed: Patient has no known allergies.     Medications reviewed:   Current Outpatient Medications   Medication Instructions    cholecalciferol (VITAMIN D3) 50,000 Units, oral, Every 7 days    multivitamin with minerals tablet 1 tablet, Daily        Vitals reviewed: Visit Vitals  /72   Pulse 63       Physical Exam:   General:  Patient is awake, alert, and oriented.  Patient is in no acute distress.  HEENT:  Pupils equal and reactive.  Normocephalic.  Moist mucosa.    Neck:  No thyromegaly.  Normal Jugular Venous Pressure.  Cardiovascular:  Regular rate and rhythm.  Normal S1 and S2.    Pulmonary:  Clear to auscultation bilaterally.  Abdomen:  Soft. Non-tender.   Non-distended.  Positive bowel sounds.  Lower Extremities:  2+ pedal pulses. No LE edema.  Neurologic:  Cranial nerves intact.  No focal deficit.   Skin: Skin warm and dry, normal skin turgor.   Psychiatric: Normal affect.    Last Labs:  CBC -      Lab Results   Component Value Date    WBC 5.9 10/14/2024    HGB 12.1 10/14/2024    HCT 37.0 10/14/2024     10/14/2024        CMP-  Lab Results   Component Value Date    GLUCOSE 92 10/14/2024     10/14/2024    K 3.9 10/14/2024     (H) 10/14/2024    CO2 28 10/14/2024    ANIONGAP 9 (L) 10/14/2024    BUN 10 10/14/2024    CREATININE 0.72 10/14/2024    EGFR >90 10/14/2024    CALCIUM 8.7 10/14/2024    PROT 6.3 (L) 10/14/2024    ALBUMIN 3.7 10/14/2024    AST 15 10/14/2024    ALT 21 10/14/2024    ALKPHOS 53 10/14/2024    BILITOT 0.4 10/14/2024        LIPIDS-  Lab Results   Component Value Date    CHOL 196 10/14/2024    TRIG 93 10/14/2024    HDL 35.8 10/14/2024    CHHDL 5.5 10/14/2024    VLDL 19 10/14/2024         OTHERS-  Lab Results   Component Value Date    HGBA1C 4.6 10/14/2024        I personally reviewed the patient's recent vitals, labs, medications, orders, EKGs, pertinent cardiac imaging.    Assessment and Plan:    Nehemiah Ortiz is a 24 y.o. female with pertinent history of hypertension, prior history of tobacco abuse, morbid obesity, GERD, PTSD, osteoarthritis presents to cardiology clinic to establish care.  She is planning undergo bariatric surgery.  She is working on weight loss.  She was able to quit smoking about 6 months ago.  She notes mild relatively stable dyspnea on exertion.  She is working with a  to increase her exercise capacity.  She complete greater than 6 METS of activity without clear anginal equivalent.     If there are no significant structural abnormalities on echocardiography, the patient would be low to intermediate risk for moderate risk surgery and can proceed.    We will obtain a transthoracic echocardiogram for structural evaluation including ejection fraction, assessment of regional wall motion abnormalities or valvular disease, and further evaluation of hemodynamics.    Please followup with me in Cardiology clinic as needed.  Please return to clinic sooner or seek emergent care if your symptoms reoccur or worsen.    Thank you for allowing me to participate in their care.  Please feel free to call me with any further questions or concerns.        Casey Fowler MD, FACC, [facility]  Division of Cardiovascular Medicine  System Director, Nuclear Cardiology   Medical Director, [facility] [place], [facility]   Clinical , [employer]  [email or web address]   Office:  [phone]

## 2025-04-07 ENCOUNTER — APPOINTMENT (OUTPATIENT)
Dept: PRIMARY CARE | Facility: CLINIC | Age: 25
End: 2025-04-07
Payer: COMMERCIAL

## 2025-04-14 ENCOUNTER — TELEPHONE (OUTPATIENT)
Dept: SURGERY | Facility: CLINIC | Age: 25
End: 2025-04-14
Payer: COMMERCIAL

## 2025-04-14 ENCOUNTER — APPOINTMENT (OUTPATIENT)
Dept: CARDIOLOGY | Facility: CLINIC | Age: 25
End: 2025-04-14
Payer: COMMERCIAL

## 2025-04-14 ENCOUNTER — APPOINTMENT (OUTPATIENT)
Dept: CARDIOLOGY | Facility: HOSPITAL | Age: 25
End: 2025-04-14
Payer: COMMERCIAL

## 2025-04-14 NOTE — TELEPHONE ENCOUNTER
Called and spoke with patient,  patient is very upset that she has to pay another $80 dollars and she's also upset that the dr's office didn't tell her it was going to .  Told her that they do not know the insurance side of things and that this is a requirement from your insurance to make sure the clearance is valid with in 6 months.  Told her to go ahead and try to scheduled another appt, patient stated she would prefer to call our psych dept, told her to reach out ASAP and see if she can be placed on a wait list as well.    Patient just got in from out of town and picked up her CPAP and will use it starting today.

## 2025-04-21 ENCOUNTER — APPOINTMENT (OUTPATIENT)
Dept: PRIMARY CARE | Facility: CLINIC | Age: 25
End: 2025-04-21
Payer: COMMERCIAL

## 2025-04-23 ENCOUNTER — NUTRITION (OUTPATIENT)
Dept: SURGERY | Facility: CLINIC | Age: 25
End: 2025-04-23
Payer: COMMERCIAL

## 2025-04-23 VITALS — HEIGHT: 63 IN | BODY MASS INDEX: 51.91 KG/M2 | WEIGHT: 293 LBS

## 2025-04-23 NOTE — PROGRESS NOTES
S:  Pt is eating 3 meals per day that contain protein.  Pt is practicing the 30-30-30 rule.  She takes about 45 min to complete a meal.      Diet  recall:   B:  Greek yogurt w/granola or yogurt drink   S:   fruit or smoothie (fruit w/Greek yogurt)   L:   chicken Caesar wrapped in lettuce or tuna or protein bowl w/chicken and sauteed veggies  S:  none  D:   salmon and cauliflower rice and peapods or chicken w/sweet potato or beef over mashed potatoes   S:  none  Beverages:  >64 oz water, Lemon Water,       O:    Wt:  394.0   (dr edge 2 weeks ago)    Ht:    63.0 in           BMI: 70.0    Goal: 5% body weight loss over the course of program    Dietary recommendation:   1. Start measuring your portions and track your intake daily.  Aim for about 6212-3372 calories daily  2. Continue to practice the 30-30-30 rule by drinking between meals.  3. Continue to have 3 meals and 1 snack daily.  4. Increase physical activity by 10-15 minutes to an end goal of 60 minutes 5 x per week.    A/P:  Pt is doing well making changes in her eating habits.  She is prepping meals and making appropriate food choices.  From a nutritional standpoint, she is cleared for surgery.  She will continue to work on weight loss in the interim.      Exercise:    walking for 25 min 6x/week     Shae Esparza RD, LD

## 2025-05-05 ENCOUNTER — APPOINTMENT (OUTPATIENT)
Dept: PRIMARY CARE | Facility: CLINIC | Age: 25
End: 2025-05-05
Payer: COMMERCIAL

## 2025-05-06 ENCOUNTER — HOSPITAL ENCOUNTER (OUTPATIENT)
Dept: CARDIOLOGY | Facility: HOSPITAL | Age: 25
Discharge: HOME | End: 2025-05-06
Payer: COMMERCIAL

## 2025-05-06 DIAGNOSIS — R06.09 DOE (DYSPNEA ON EXERTION): ICD-10-CM

## 2025-05-06 DIAGNOSIS — E66.01 MORBID OBESITY WITH BODY MASS INDEX (BMI) OF 60.0 TO 69.9 IN ADULT (MULTI): ICD-10-CM

## 2025-05-06 DIAGNOSIS — Z01.818 ENCOUNTER FOR OTHER PREPROCEDURAL EXAMINATION: ICD-10-CM

## 2025-05-06 DIAGNOSIS — Z98.84 BARIATRIC SURGERY STATUS: ICD-10-CM

## 2025-05-06 DIAGNOSIS — Z01.810 PREOP CARDIOVASCULAR EXAM: ICD-10-CM

## 2025-05-06 LAB
AORTIC VALVE MEAN GRADIENT: 5 MMHG
AORTIC VALVE PEAK VELOCITY: 1.65 M/S
AV PEAK GRADIENT: 11 MMHG
AVA (PEAK VEL): 2.28 CM2
AVA (VTI): 2.36 CM2
EJECTION FRACTION APICAL 4 CHAMBER: 57.2
EJECTION FRACTION: 56 %
LEFT ATRIUM VOLUME AREA LENGTH INDEX BSA: 22.2 ML/M2
LEFT VENTRICLE INTERNAL DIMENSION DIASTOLE: 4.96 CM (ref 3.5–6)
LEFT VENTRICULAR OUTFLOW TRACT DIAMETER: 2.03 CM
MITRAL VALVE E/A RATIO: 1.47
RIGHT VENTRICLE FREE WALL PEAK S': 14 CM/S
TRICUSPID ANNULAR PLANE SYSTOLIC EXCURSION: 3.2 CM

## 2025-05-06 PROCEDURE — 93306 TTE W/DOPPLER COMPLETE: CPT | Performed by: STUDENT IN AN ORGANIZED HEALTH CARE EDUCATION/TRAINING PROGRAM

## 2025-05-06 PROCEDURE — 2500000004 HC RX 250 GENERAL PHARMACY W/ HCPCS (ALT 636 FOR OP/ED): Performed by: STUDENT IN AN ORGANIZED HEALTH CARE EDUCATION/TRAINING PROGRAM

## 2025-05-06 PROCEDURE — 93306 TTE W/DOPPLER COMPLETE: CPT

## 2025-05-06 RX ADMIN — PERFLUTREN 2 ML OF DILUTION: 6.52 INJECTION, SUSPENSION INTRAVENOUS at 11:30

## 2025-05-09 ENCOUNTER — APPOINTMENT (OUTPATIENT)
Dept: PRIMARY CARE | Facility: CLINIC | Age: 25
End: 2025-05-09
Payer: COMMERCIAL

## 2025-05-09 VITALS
OXYGEN SATURATION: 98 % | BODY MASS INDEX: 51.91 KG/M2 | WEIGHT: 293 LBS | HEART RATE: 86 BPM | RESPIRATION RATE: 17 BRPM | DIASTOLIC BLOOD PRESSURE: 84 MMHG | HEIGHT: 63 IN | SYSTOLIC BLOOD PRESSURE: 132 MMHG

## 2025-05-09 DIAGNOSIS — R60.9 DEPENDENT EDEMA: Primary | ICD-10-CM

## 2025-05-09 DIAGNOSIS — Z98.84 BARIATRIC SURGERY STATUS: ICD-10-CM

## 2025-05-09 DIAGNOSIS — E66.01 MORBID OBESITY WITH BMI OF 70 AND OVER, ADULT (MULTI): ICD-10-CM

## 2025-05-09 DIAGNOSIS — K21.9 GASTROESOPHAGEAL REFLUX DISEASE WITHOUT ESOPHAGITIS: ICD-10-CM

## 2025-05-09 PROBLEM — S29.019A STRAIN OF THORACIC REGION: Status: RESOLVED | Noted: 2023-03-18 | Resolved: 2025-05-09

## 2025-05-09 PROBLEM — S46.911A STRAIN OF RIGHT SHOULDER: Status: RESOLVED | Noted: 2023-03-18 | Resolved: 2025-05-09

## 2025-05-09 PROBLEM — Z91.199 NO-SHOW FOR APPOINTMENT: Status: RESOLVED | Noted: 2023-11-07 | Resolved: 2025-05-09

## 2025-05-09 PROBLEM — A59.9 TRICHIMONIASIS: Status: RESOLVED | Noted: 2023-03-18 | Resolved: 2025-05-09

## 2025-05-09 PROCEDURE — 1036F TOBACCO NON-USER: CPT | Performed by: NURSE PRACTITIONER

## 2025-05-09 PROCEDURE — 99214 OFFICE O/P EST MOD 30 MIN: CPT | Performed by: NURSE PRACTITIONER

## 2025-05-09 PROCEDURE — 3075F SYST BP GE 130 - 139MM HG: CPT | Performed by: NURSE PRACTITIONER

## 2025-05-09 PROCEDURE — 3008F BODY MASS INDEX DOCD: CPT | Performed by: NURSE PRACTITIONER

## 2025-05-09 PROCEDURE — 3079F DIAST BP 80-89 MM HG: CPT | Performed by: NURSE PRACTITIONER

## 2025-05-09 RX ORDER — FUROSEMIDE 20 MG/1
20 TABLET ORAL DAILY PRN
Qty: 60 TABLET | Refills: 0 | Status: SHIPPED | OUTPATIENT
Start: 2025-05-09 | End: 2025-07-08

## 2025-05-09 RX ORDER — POTASSIUM CHLORIDE 20 MEQ/1
TABLET, EXTENDED RELEASE ORAL
Qty: 60 TABLET | Refills: 0 | Status: SHIPPED | OUTPATIENT
Start: 2025-05-09

## 2025-05-09 RX ORDER — OMEPRAZOLE 20 MG/1
20 CAPSULE, DELAYED RELEASE ORAL DAILY
Qty: 90 CAPSULE | Refills: 0 | Status: SHIPPED | OUTPATIENT
Start: 2025-05-09 | End: 2025-08-07

## 2025-05-09 ASSESSMENT — ENCOUNTER SYMPTOMS
OCCASIONAL FEELINGS OF UNSTEADINESS: 0
LOSS OF SENSATION IN FEET: 0
DEPRESSION: 0

## 2025-05-09 ASSESSMENT — COLUMBIA-SUICIDE SEVERITY RATING SCALE - C-SSRS
6. HAVE YOU EVER DONE ANYTHING, STARTED TO DO ANYTHING, OR PREPARED TO DO ANYTHING TO END YOUR LIFE?: NO
1. IN THE PAST MONTH, HAVE YOU WISHED YOU WERE DEAD OR WISHED YOU COULD GO TO SLEEP AND NOT WAKE UP?: NO
2. HAVE YOU ACTUALLY HAD ANY THOUGHTS OF KILLING YOURSELF?: NO

## 2025-05-09 NOTE — PROGRESS NOTES
"Subjective   Patient ID: Nehemiah Ortiz is a 24 y.o. female who presents for Pre-op Exam (Bariatric surgery clearance-would like to get back on omeprazole and water pill ).    HPI     Patient presents for primary care support for bariatric surgery. She is going through the process of getting approved for gastric bypass surgery. She has tried many different diets to lose weight, including Ketogenic, Brenda Duncan, and OTC weight loss medications without any meaningful weight loss. She is currently seeing a dietician monthly and counting/limiting calories and making healthier choices. She is exercising regularly.     She also mentions that she was previously on triamterene-hydrochlorothiazide for hypertension and leg swelling, but this was stopped. Was told she doesn't need these. She checks her blood pressure occasionally and runs 120s-130s/70s-80s. She does get leg swelling almost daily. Occasionally wakes up with it but usually occurs towards the end of the day. Did have an echocardiogram on 5/6/2025 which was normal.     She is also getting frequent, daily heartburn. She notices she gets this after drinking her Premier Protein shake every morning and when she lays down to go to bed. She had an EGD on 10/17/2024 which showed a small hiatal hernia. She was previously on omeprazole but was told she didn't need it. She takes TUMS which helps.     Review of Systems  ROS negative except as noted above in HPI.     Objective   /84 (BP Location: Left arm, Patient Position: Sitting, BP Cuff Size: Large adult) Comment (BP Location): forearm  Pulse 86   Resp 17   Ht 1.6 m (5' 2.99\")   Wt (!) 187 kg (411 lb 9.6 oz)   SpO2 98%   BMI 72.93 kg/m²     Results for orders placed during the hospital encounter of 05/06/25    Transthoracic Echo (TTE) Complete    Narrative  [facility], [facility] [address]  Tel [phone] and Fax [phone]    TRANSTHORACIC ECHOCARDIOGRAM " REPORT      Patient Name:       SOREN ZEPEDA    Reading Physician:    [id number] Maciel MD  Study Date:         5/6/2025            Ordering Provider:    [id number] SHADY MACIEL  MRN/PID:            [id number]            Fellow:  Accession#:         [id number]        Nurse:  Date of Birth/Age:  2000 / 24 years Sonographer:          Pratima Anthony RDCS  Gender assigned at  F                   Additional Staff:  Birth:  Height:             160.00 cm           Admit Date:  Weight:             179.00 kg           Admission Status:     Outpatient  BSA / BMI:          2.58 m2 / 69.92     Encounter#:           [id number]  kg/m2  Blood Pressure:     124/72 mmHg         Department Location:  [facility] Non  Invasive    Study Type:    TRANSTHORACIC ECHO (TTE) COMPLETE  Diagnosis/ICD: Other forms of dyspnea-R06.09; Encounter for other preprocedural  examination-Z01.818  Indication:    STONE, pre-op  CPT Code:      Echo Complete w Full Doppler-20205    Patient History:  Pertinent History: Dyspnea, HTN and GERD.    Study Detail: The following Echo studies were performed: 2D, M-Mode, Doppler and  color flow. Image quality for this study is less than ideal.  Technically challenging study due to poor acoustic windows and  body habitus. Definity used as a contrast agent for endocardial  border definition. Total contrast used for this procedure was 1 mL  via IV push.      PHYSICIAN INTERPRETATION:  Left Ventricle: Left ventricular ejection fraction is normal, calculated by Gonzáles's biplane at 56%. There are no regional left ventricular wall motion abnormalities. The left ventricular cavity size is normal. There is mildly increased septal and mildly increased posterior left ventricular wall thickness. There is left ventricular concentric remodeling. Spectral Doppler shows a normal pattern of left ventricular diastolic filling.  Left Atrium: The left atrial size is normal.  Right Ventricle: The right ventricle is normal in  size. There is normal right ventricular global systolic function.  Right Atrium: The right atrium is normal in size.  Aortic Valve: The aortic valve is trileaflet. The aortic valve dimensionless index is 0.73. There is no evidence of aortic valve regurgitation. The peak instantaneous gradient of the aortic valve is 11 mmHg. The mean gradient of the aortic valve is 5 mmHg.  Mitral Valve: The mitral valve is normal in structure. There is no evidence of mitral valve regurgitation.  Tricuspid Valve: The tricuspid valve is structurally normal. No evidence of tricuspid regurgitation. The right ventricular systolic pressure is unable to be estimated.  Pulmonic Valve: The pulmonic valve is structurally normal. There is no indication of pulmonic valve regurgitation.  Pericardium: There is no pericardial effusion noted.  Aorta: The aortic root is normal.  Systemic Veins: The inferior vena cava appears normal in size, with IVC inspiratory collapse greater than 50%.  In comparison to the previous echocardiogram(s): There are no prior studies on this patient for comparison purposes.      CONCLUSIONS:  1. Left ventricular ejection fraction is normal, calculated by Gonzáles's biplane at 56%.    QUANTITATIVE DATA SUMMARY:    2D MEASUREMENTS:          Normal Ranges:  Ao Root s:       2.90 cm  IVSd:            1.20 cm  (0.6-1.1cm)  LVPWd:           1.23 cm  (0.6-1.1cm)  LVIDd:           4.96 cm  (3.9-5.9cm)  LVIDs:           3.53 cm  LV Mass Index:   91 g/m2  LVEDV Index:     54 ml/m2  LV % FS          28.8 %      LEFT ATRIUM:                  Normal Ranges:  LA Vol A4C:        50.6 ml    (22+/-6mL/m2)  LA Vol A2C:        60.2 ml  LA Vol BP:         57.3 ml  LA Vol Index A4C:  19.6ml/m2  LA Vol Index A2C:  23.3 ml/m2  LA Vol Index BP:   22.2 ml/m2  LA Area A4C:       18.1 cm2  LA Area A2C:       19.0 cm2  LA Major Axis A4C: 5.5 cm  LA Major Axis A2C: 5.1 cm  LA Volume Index:   22.2 ml/m2  LA Vol A4C:        51.2 ml  LA Vol A2C:         58.1 ml  LA Vol Index BSA:  21.2 ml/m2      AORTA MEASUREMENTS:         Normal Ranges:  Ao Sinus, d:        2.90 cm (2.1-3.5cm)  Ao STJ, d:          2.20 cm (1.7-3.4cm)  Asc Ao, d:          3.10 cm (2.1-3.4cm)      LV SYSTOLIC FUNCTION:  Normal Ranges:  EF-A4C View:    57 % (>=55%)  EF-A2C View:    55 %  EF-Biplane:     56 %  LV EF Reported: 56 %      LV DIASTOLIC FUNCTION:             Normal Ranges:  MV Peak E:             0.94 m/s    (0.7-1.2 m/s)  MV Peak A:             0.64 m/s    (0.42-0.7 m/s)  E/A Ratio:             1.47        (1.0-2.2)  MV e'                  0.140 m/s   (>8.0)  MV lateral e'          0.16 m/s  MV medial e'           0.12 m/s  MV A Dur:              140.82 msec  E/e' Ratio:            6.69        (<8.0)  PulmV Sys Kel:         43.87 cm/s  PulmV Pham Kel:        39.44 cm/s  PulmV S/D Kel:         1.11  PulmV A Revs Kel:      23.41 cm/s  PulmV A Revs Dur:      131.30 msec      MITRAL VALVE:          Normal Ranges:  MV DT:        236 msec (150-240msec)      AORTIC VALVE:                      Normal Ranges:  AoV Vmax:                1.65 m/s  (<=1.7m/s)  AoV Peak PG:             10.8 mmHg (<20mmHg)  AoV Mean P.3 mmHg  (1.7-11.5mmHg)  LVOT Max Kel:            1.16 m/s  (<=1.1m/s)  AoV VTI:                 33.09 cm  (18-25cm)  LVOT VTI:                24.07 cm  LVOT Diameter:           2.03 cm   (1.8-2.4cm)  AoV Area, VTI:           2.36 cm2  (2.5-5.5cm2)  AoV Area,Vmax:           2.28 cm2  (2.5-4.5cm2)  AoV Dimensionless Index: 0.73      RIGHT VENTRICLE:  TAPSE: 32.4 mm  RV s'  0.14 m/s      TRICUSPID VALVE/RVSP:         Normal Ranges:  IVC Diam:             1.73 cm      PULMONIC VALVE:          Normal Ranges:  PV Accel Time:  116 msec (>120ms)  PV Max Kel:     1.5 m/s  (0.6-0.9m/s)  PV Max P.1 mmHg      PULMONARY VEINS:  PulmV A Revs Dur: 131.30 msec  PulmV A Revs Kel: 23.41 cm/s  PulmV Pham Kel:   39.44 cm/s  PulmV S/D Kel:    1.11  PulmV Sys Kel:    43.87  cm/s      AORTA:  Asc Ao Diam 3.12 cm      [id number] Casey Fowler MD  Electronically signed on 5/6/2025 at 7:39:27 PM        ** Final **     Physical Exam  General: Alert and oriented, in no acute distress. Appears stated age, well-nourished, and well hydrated  HEENT:  - Head: Normocephalic and atraumatic   - Eyes: EOMI, PERRLA  - ENT: Hearing grossly intact  Heart: RRR. No murmurs, clicks, or rubs  Lungs: Unlabored breathing. CTAB with no crackles, wheezes, or rhonchi  Abdomen: Normal BS in all 4 quadrants. Soft, non-tender, non-distended, with no masses  Extremities: Warm and well perfused. No edema. Normal peripheral pulses  Musculoskeletal: Normal gait and station  Neurological: Alert and oriented. No gross neurological deficits  Psychological: Appropriate mood and affect  Skin: No rash, abnormal lesions, cyanosis, or erythema    Assessment/Plan   Diagnoses and all orders for this visit:  Bariatric surgery status  Morbid obesity with BMI of 70 and over, adult (Multi)  - I believe patient is a good candidate for bariatric surgery; will send primary care support letter   - Continue regular exercise, improved diet  Dependent edema  - furosemide (Lasix) 20 mg tablet; Take 1 tablet (20 mg) by mouth once daily as needed (leg swelling).  - potassium chloride CR (Klor-Con M20) 20 mEq ER tablet; Take 1 tablet once daily as needed when taking furosemide. Do not crush or chew.  - Leg elevation, compression hose, limit salt intake  Gastroesophageal reflux disease without esophagitis  - omeprazole (PriLOSEC) 20 mg DR capsule; Take 1 capsule (20 mg) by mouth once daily. Do not crush or chew.    FU in 1 month    VESNA Montenegro-CNP  [facility]

## 2025-05-19 ENCOUNTER — NUTRITION (OUTPATIENT)
Dept: SURGERY | Facility: CLINIC | Age: 25
End: 2025-05-19
Payer: COMMERCIAL

## 2025-05-19 VITALS — HEIGHT: 63 IN | WEIGHT: 293 LBS | BODY MASS INDEX: 51.91 KG/M2

## 2025-05-19 NOTE — PROGRESS NOTES
S:  Pt reports some issues with bilateral leg and feet swelling. She is going to follow up with her PCP and start a diuretic again.   She purchased a food scale and has been weighing out 4-6 oz of protein.    She has been practicing the 30-30-30 rule, eating slowly and chewing well.  She takes about 30-35 min to complete a meal.        Diet  recall:   B:  Greek yogurt w/granola and 2-3 HB egg whites  S:  fruit salad   L: salad w/tuna or chicken or shrimp  S:  none  D:  protein bowl w/sauteed veggies, protein and rice or potatoes  S:  none  Beverages:  gallon of water/day      O:    Wt:  393.8      Ht:  63.0 in             BMI: 69.7    Goal: 5% body weight loss over the course of program    Dietary recommendation:   1. Continue to practice the 30-30-30 rule by drinking between meals.  2 Continue to have 3 meals and 1 snack daily.  3. Weigh meats/fish/poultry to 4 oz after cooking.   4. Work on getting back into your exercise routine    A/P:   Pt continues to do well.  Recommend that she get back into her exercise routine and keep working on weight loss.         Exercise:  none in the past month d/t swollen feet, recently started walking     Shae Esparza RD, LD

## 2025-05-20 ENCOUNTER — TELEMEDICINE CLINICAL SUPPORT (OUTPATIENT)
Dept: SURGERY | Facility: CLINIC | Age: 25
End: 2025-05-20
Payer: COMMERCIAL

## 2025-06-04 ENCOUNTER — APPOINTMENT (OUTPATIENT)
Dept: PRIMARY CARE | Facility: CLINIC | Age: 25
End: 2025-06-04
Payer: COMMERCIAL

## 2025-06-09 ENCOUNTER — APPOINTMENT (OUTPATIENT)
Dept: OBSTETRICS AND GYNECOLOGY | Facility: CLINIC | Age: 25
End: 2025-06-09
Payer: COMMERCIAL

## 2025-06-16 ENCOUNTER — TELEPHONE (OUTPATIENT)
Dept: SURGERY | Facility: CLINIC | Age: 25
End: 2025-06-16
Payer: COMMERCIAL

## 2025-06-16 NOTE — TELEPHONE ENCOUNTER
Pt check in early for apt and then left.  When it came time for the appt, she did not show up.  Called pt to try to do the appt over the phone.  She did not answer.     Could not LVM, mailbox full.

## 2025-06-23 ENCOUNTER — TELEPHONE (OUTPATIENT)
Dept: SURGERY | Facility: CLINIC | Age: 25
End: 2025-06-23
Payer: COMMERCIAL

## 2025-06-27 LAB — 25(OH)D3+25(OH)D2 SERPL-MCNC: 13 NG/ML (ref 30–100)

## 2025-06-28 LAB
AMPHETAMINES UR QL: NEGATIVE NG/ML
BARBITURATES UR QL: NEGATIVE NG/ML
BENZODIAZ UR QL: NEGATIVE NG/ML
BZE UR QL: NEGATIVE NG/ML
CREAT UR-MCNC: 71.9 MG/DL
METHADONE UR QL: NEGATIVE NG/ML
OPIATES UR QL: NEGATIVE NG/ML
OXIDANTS UR QL: NEGATIVE MCG/ML
OXYCODONE UR QL: NEGATIVE NG/ML
PCP UR QL: NEGATIVE NG/ML
PH UR: 7.7 [PH] (ref 4.5–9)
QUEST NOTES AND COMMENTS: NORMAL
THC UR QL: NEGATIVE NG/ML

## 2025-06-30 DIAGNOSIS — E55.9 VITAMIN D DEFICIENCY: ICD-10-CM

## 2025-06-30 RX ORDER — ERGOCALCIFEROL 1.25 MG/1
1.25 CAPSULE ORAL 2 TIMES WEEKLY
Qty: 16 CAPSULE | Refills: 0 | Status: SHIPPED | OUTPATIENT
Start: 2025-06-30 | End: 2025-08-25

## 2025-07-01 ENCOUNTER — APPOINTMENT (OUTPATIENT)
Dept: BEHAVIORAL HEALTH | Facility: CLINIC | Age: 25
End: 2025-07-01
Payer: COMMERCIAL

## 2025-07-02 ENCOUNTER — DOCUMENTATION (OUTPATIENT)
Dept: SURGERY | Facility: CLINIC | Age: 25
End: 2025-07-02
Payer: COMMERCIAL

## 2025-07-02 ENCOUNTER — TELEPHONE (OUTPATIENT)
Dept: SURGERY | Facility: CLINIC | Age: 25
End: 2025-07-02
Payer: COMMERCIAL

## 2025-07-02 LAB
ANABASINE UR-MCNC: <2 NG/ML
COTININE UR-MCNC: <2 NG/ML
NICOTINE UR-MCNC: <2 NG/ML
NORCOTININE UR-MCNC: <2 NG/ML
QUEST 3-OH-COTININE, URINE: <2 NG/ML
QUEST NORNICOTINE,UR: <2 NG/ML

## 2025-07-02 NOTE — PROGRESS NOTES
"  Letter of Medical Necessity    25      ATTN: Pre-Authorization  Department           Tentative Surgery Date:  25    RE: Nehemiah Ortiz    : 2000         BMI: 67.43    Weight: 399 lb      Height:  5' 4.5\"    Ms. Nehemiah Ortiz suffers from multiple health conditions including Morbid Obesity (E66.01). An extensive clinical evaluation has been completed and the final recommendation has been provided to Ms. Ortiz; explanation of short and long term surgical risks vs. benefits has been reviewed at length. Ms. Ortiz has verbalized an understanding of the associated risks, has agreed to comply with behavioral and lifestyle changes that support a successful post-surgical outcome, resolution of comorbid conditions and improvement in quality of life.    The patient suffers from the following health conditions:   Problem List[1]      We kindly request careful review of the following documents: Surgeon's consultation with weight related comorbidities and diagnoses, primary care support letter, psychiatric evaluation,  nutrition assessment and other pertinent information required by the member's plan.      Ms. Ortiz has attended our pre-operative educational programs and verbalizes that she has an understanding of the behaviors and choices necessary to be successful with bariatric surgery for a lifetime. Ms. Ortiz further understands that in order to be successful she must attend post-operative visits at 1week, 6 weeks, 3 months, 6 months, 12 months, and annually to review her progress and consult with our registered dietitian. In addition Ms. Ortiz agrees to attend monthly support group meetings hosted by our licensed program staff to further enhance her chances of successful lifetime weight loss. she has agreed to participate in exercise 5 days per week as tolerated and has already been advised  to increase her physical activity in preparation for surgery.              We kindly request review of " prior-authorization for a 1 to 2 day hospital stay for procedure Laparoscopic jered en y gastric bypass [id number] with Dr. Anu Erwin  NPI:[id number]  TID: [id number] at [facility]. Tentative surgery date 8/27/25.    Please fax your approval or requests for  additional information to the attention of Maribel Cabezas, Patient  at [phone], this is a secured fax line.    We sincerely appreciate your thoughtful review of this case.      Sincerely,  Dr. Anu Erwin  [facility]    Enclosures        [1]   Patient Active Problem List  Diagnosis    Bariatric surgery status    Benign hypertension    Breakthrough bleeding with IUD    Cervical spine pain    Dysmenorrhea    Frequent headaches    GERD (gastroesophageal reflux disease)    Irregular menses    Joint pain, knee    Lumbar spine pain    Lumbosacral strain    Menorrhagia    Morbid obesity (Multi)    Pain of left hand    Sleep disorder breathing    Preop cardiovascular exam    PTSD (post-traumatic stress disorder)    History of depression    Problems related to inappropriate diet and eating habits    Morbid obesity with body mass index (BMI) of 60.0 to 69.9 in adult (Multi)    Daytime somnolence    At risk for deep venous thrombosis    Asthma

## 2025-07-02 NOTE — TELEPHONE ENCOUNTER
Spoke with rep @ [phone], she attempted to assist me with website, firewall blocked it.  Rep is going to send me cover letter to fax over with clinical for prior auth. Fax# [phone]

## 2025-07-03 ENCOUNTER — TELEPHONE (OUTPATIENT)
Dept: SURGERY | Facility: CLINIC | Age: 25
End: 2025-07-03
Payer: COMMERCIAL

## 2025-07-07 ENCOUNTER — APPOINTMENT (OUTPATIENT)
Dept: OBSTETRICS AND GYNECOLOGY | Facility: CLINIC | Age: 25
End: 2025-07-07
Payer: COMMERCIAL

## 2025-07-09 ENCOUNTER — TELEPHONE (OUTPATIENT)
Dept: SURGERY | Facility: CLINIC | Age: 25
End: 2025-07-09
Payer: COMMERCIAL

## 2025-07-09 NOTE — TELEPHONE ENCOUNTER
Called saul of [email or web address] @ [phone] spoke with rep Salvador, was told they have not processed any faxes from that day yet. Once auth is opened they will send a fax of receipt and also will send a fax once decision has been made.  Probably will take a few more days to process fax.

## 2025-07-10 ENCOUNTER — APPOINTMENT (OUTPATIENT)
Dept: SURGERY | Facility: CLINIC | Age: 25
End: 2025-07-10
Payer: COMMERCIAL

## 2025-07-10 NOTE — PROGRESS NOTES
S:    pt is not tracking intake.  She just got back from a cruise.  She feels she did well. She only had dessert on 1 day out of the 6.  Pt bought a food scale to weigh out protein servings.    Pt states she is still practicing the 30-30-30 rule, eating slowly and chewing well.      Diet  recall:   B:  yogurt with  HB eggs, plascencia or sausage   S:  none  L:  salad w/protein or wrap   S:  fruit or apple and PB   D:  D:  protein bowl w/sauteed veggies, protein and subs starch with cauliflower rice.   S:   none or may have a fruit serving  Beverages:  gallon of water       O:    Wt:   --     Ht:   63.0 in            BMI: --    Goal: 5% body weight loss over the course of program    Dietary recommendation:   1. Continue to practice the 30-30-30 rule by drinking between meals.  2 Continue to have 3 meals and 1 snack daily.  3. Continue to exercise.    4.  Start the preop diet 2 weeks before surgery     A/P:   Pt is doing well. Encouraged to keep losing weight.  Reviewed the preop diet, postop diet and postop supplements.  Emailed the NG booklet, preop diet and sample meal plan    Exercise:   working out with  4x/week for 2 hours, 45 min is water aerobics and 60 min cardio and Strength training for 30 min 2x/week       Shae Esparza RD, ANABELA

## 2025-07-15 ENCOUNTER — TELEPHONE (OUTPATIENT)
Dept: SURGERY | Facility: CLINIC | Age: 25
End: 2025-07-15
Payer: COMMERCIAL

## 2025-07-15 NOTE — TELEPHONE ENCOUNTER
Received APPROVAL notice from [facility].     Case# [id number]  8-27-25 TO 9-8-25 inpatient hospital stay approved for 13 days

## 2025-07-16 DIAGNOSIS — Z98.84 BARIATRIC SURGERY STATUS: Primary | ICD-10-CM

## 2025-07-16 DIAGNOSIS — Z01.818 PREOPERATIVE CLEARANCE: ICD-10-CM

## 2025-07-31 ENCOUNTER — APPOINTMENT (OUTPATIENT)
Dept: SURGERY | Facility: CLINIC | Age: 25
End: 2025-07-31
Payer: COMMERCIAL

## 2025-08-07 ENCOUNTER — APPOINTMENT (OUTPATIENT)
Dept: SURGERY | Facility: CLINIC | Age: 25
End: 2025-08-07
Payer: COMMERCIAL

## 2025-08-15 ENCOUNTER — PRE-ADMISSION TESTING (OUTPATIENT)
Dept: PREADMISSION TESTING | Facility: HOSPITAL | Age: 25
End: 2025-08-15
Payer: COMMERCIAL

## 2025-08-18 ENCOUNTER — HOSPITAL ENCOUNTER (OUTPATIENT)
Dept: RADIOLOGY | Facility: HOSPITAL | Age: 25
Discharge: HOME | End: 2025-08-18
Payer: COMMERCIAL

## 2025-08-18 ENCOUNTER — PRE-ADMISSION TESTING (OUTPATIENT)
Dept: PREADMISSION TESTING | Facility: HOSPITAL | Age: 25
End: 2025-08-18
Payer: COMMERCIAL

## 2025-08-18 VITALS
OXYGEN SATURATION: 95 % | WEIGHT: 293 LBS | BODY MASS INDEX: 51.91 KG/M2 | HEART RATE: 84 BPM | RESPIRATION RATE: 20 BRPM | DIASTOLIC BLOOD PRESSURE: 83 MMHG | HEIGHT: 63 IN | TEMPERATURE: 96.8 F | SYSTOLIC BLOOD PRESSURE: 129 MMHG

## 2025-08-18 DIAGNOSIS — Z01.818 PREOPERATIVE CLEARANCE: ICD-10-CM

## 2025-08-18 DIAGNOSIS — Z98.84 BARIATRIC SURGERY STATUS: ICD-10-CM

## 2025-08-18 DIAGNOSIS — Z01.818 PREOP TESTING: Primary | ICD-10-CM

## 2025-08-18 LAB
ABO GROUP (TYPE) IN BLOOD: NORMAL
ABO GROUP (TYPE) IN BLOOD: NORMAL
ALBUMIN SERPL BCP-MCNC: 3.8 G/DL (ref 3.4–5)
ALP SERPL-CCNC: 55 U/L (ref 33–110)
ALT SERPL W P-5'-P-CCNC: 25 U/L (ref 7–45)
AMPHETAMINES UR QL SCN: NORMAL
ANION GAP SERPL CALC-SCNC: 11 MMOL/L (ref 10–20)
ANTIBODY SCREEN: NORMAL
APPEARANCE UR: CLEAR
AST SERPL W P-5'-P-CCNC: 17 U/L (ref 9–39)
BARBITURATES UR QL SCN: NORMAL
BASOPHILS # BLD AUTO: 0.02 X10*3/UL (ref 0–0.1)
BASOPHILS NFR BLD AUTO: 0.2 %
BENZODIAZ UR QL SCN: NORMAL
BILIRUB SERPL-MCNC: 0.3 MG/DL (ref 0–1.2)
BILIRUB UR STRIP.AUTO-MCNC: NEGATIVE MG/DL
BUN SERPL-MCNC: 11 MG/DL (ref 6–23)
BZE UR QL SCN: NORMAL
CALCIUM SERPL-MCNC: 8.8 MG/DL (ref 8.6–10.3)
CANNABINOIDS UR QL SCN: NORMAL
CHLORIDE SERPL-SCNC: 106 MMOL/L (ref 98–107)
CO2 SERPL-SCNC: 26 MMOL/L (ref 21–32)
COLOR UR: ABNORMAL
CREAT SERPL-MCNC: 0.66 MG/DL (ref 0.5–1.05)
EGFRCR SERPLBLD CKD-EPI 2021: >90 ML/MIN/1.73M*2
EOSINOPHIL # BLD AUTO: 0.07 X10*3/UL (ref 0–0.7)
EOSINOPHIL NFR BLD AUTO: 0.8 %
ERYTHROCYTE [DISTWIDTH] IN BLOOD BY AUTOMATED COUNT: 13.7 % (ref 11.5–14.5)
FENTANYL+NORFENTANYL UR QL SCN: NORMAL
GLUCOSE SERPL-MCNC: 89 MG/DL (ref 74–99)
GLUCOSE UR STRIP.AUTO-MCNC: NORMAL MG/DL
HCT VFR BLD AUTO: 37.1 % (ref 36–46)
HGB BLD-MCNC: 11.4 G/DL (ref 12–16)
IMM GRANULOCYTES # BLD AUTO: 0.04 X10*3/UL (ref 0–0.7)
IMM GRANULOCYTES NFR BLD AUTO: 0.5 % (ref 0–0.9)
INR PPP: 1.1 (ref 0.9–1.1)
KETONES UR STRIP.AUTO-MCNC: NEGATIVE MG/DL
LEUKOCYTE ESTERASE UR QL STRIP.AUTO: NEGATIVE
LYMPHOCYTES # BLD AUTO: 2.67 X10*3/UL (ref 1.2–4.8)
LYMPHOCYTES NFR BLD AUTO: 30.2 %
MCH RBC QN AUTO: 28.6 PG (ref 26–34)
MCHC RBC AUTO-ENTMCNC: 30.7 G/DL (ref 32–36)
MCV RBC AUTO: 93 FL (ref 80–100)
METHADONE UR QL SCN: NORMAL
MONOCYTES # BLD AUTO: 0.59 X10*3/UL (ref 0.1–1)
MONOCYTES NFR BLD AUTO: 6.7 %
NEUTROPHILS # BLD AUTO: 5.45 X10*3/UL (ref 1.2–7.7)
NEUTROPHILS NFR BLD AUTO: 61.6 %
NITRITE UR QL STRIP.AUTO: ABNORMAL
NRBC BLD-RTO: 0 /100 WBCS (ref 0–0)
OPIATES UR QL SCN: NORMAL
OXYCODONE+OXYMORPHONE UR QL SCN: NORMAL
PCP UR QL SCN: NORMAL
PH UR STRIP.AUTO: 7.5 [PH]
PLATELET # BLD AUTO: 314 X10*3/UL (ref 150–450)
POTASSIUM SERPL-SCNC: 4 MMOL/L (ref 3.5–5.3)
PROT SERPL-MCNC: 6.4 G/DL (ref 6.4–8.2)
PROT UR STRIP.AUTO-MCNC: NEGATIVE MG/DL
PROTHROMBIN TIME: 11.9 SECONDS (ref 9.8–12.4)
RBC # BLD AUTO: 3.99 X10*6/UL (ref 4–5.2)
RBC # UR STRIP.AUTO: NEGATIVE MG/DL
RBC #/AREA URNS AUTO: NORMAL /HPF
RH FACTOR (ANTIGEN D): NORMAL
RH FACTOR (ANTIGEN D): NORMAL
SODIUM SERPL-SCNC: 139 MMOL/L (ref 136–145)
SP GR UR STRIP.AUTO: 1.01
UROBILINOGEN UR STRIP.AUTO-MCNC: NORMAL MG/DL
WBC # BLD AUTO: 8.8 X10*3/UL (ref 4.4–11.3)
WBC #/AREA URNS AUTO: NORMAL /HPF

## 2025-08-18 PROCEDURE — 80307 DRUG TEST PRSMV CHEM ANLYZR: CPT | Performed by: SURGERY

## 2025-08-18 PROCEDURE — 93005 ELECTROCARDIOGRAM TRACING: CPT

## 2025-08-18 PROCEDURE — 71045 X-RAY EXAM CHEST 1 VIEW: CPT

## 2025-08-18 PROCEDURE — 87086 URINE CULTURE/COLONY COUNT: CPT | Mod: PARLAB | Performed by: SURGERY

## 2025-08-18 PROCEDURE — 84075 ASSAY ALKALINE PHOSPHATASE: CPT | Performed by: SURGERY

## 2025-08-18 PROCEDURE — 85025 COMPLETE CBC W/AUTO DIFF WBC: CPT | Performed by: SURGERY

## 2025-08-18 PROCEDURE — 36415 COLL VENOUS BLD VENIPUNCTURE: CPT

## 2025-08-18 PROCEDURE — 81001 URINALYSIS AUTO W/SCOPE: CPT | Mod: 59 | Performed by: SURGERY

## 2025-08-18 PROCEDURE — 80323 ALKALOIDS NOS: CPT | Performed by: SURGERY

## 2025-08-18 PROCEDURE — 85610 PROTHROMBIN TIME: CPT | Performed by: SURGERY

## 2025-08-18 PROCEDURE — 86850 RBC ANTIBODY SCREEN: CPT | Performed by: SURGERY

## 2025-08-18 ASSESSMENT — DUKE ACTIVITY SCORE INDEX (DASI)
CAN YOU RUN A SHORT DISTANCE: NO
CAN YOU PARTICIPATE IN MODERATE RECREATIONAL ACTIVITIES LIKE GOLF, BOWLING, DANCING, DOUBLES TENNIS OR THROWING A BASEBALL OR FOOTBALL: YES
CAN YOU TAKE CARE OF YOURSELF (EAT, DRESS, BATHE, OR USE TOILET): YES
CAN YOU PARTICIPATE IN STRENOUS SPORTS LIKE SWIMMING, SINGLES TENNIS, FOOTBALL, BASKETBALL, OR SKIING: YES
CAN YOU WALK INDOORS, SUCH AS AROUND YOUR HOUSE: YES
CAN YOU DO LIGHT WORK AROUND THE HOUSE LIKE DUSTING OR WASHING DISHES: YES
CAN YOU DO MODERATE WORK AROUND THE HOUSE LIKE VACUUMING, SWEEPING FLOORS OR CARRYING GROCERIES: YES
CAN YOU WALK A BLOCK OR TWO ON LEVEL GROUND: YES
CAN YOU DO HEAVY WORK AROUND THE HOUSE LIKE SCRUBBING FLOORS OR LIFTING AND MOVING HEAVY FURNITURE: YES
CAN YOU HAVE SEXUAL RELATIONS: YES
CAN YOU DO YARD WORK LIKE RAKING LEAVES, WEEDING OR PUSHING A MOWER: YES
TOTAL_SCORE: 50.2
DASI METS SCORE: 8.9
CAN YOU CLIMB A FLIGHT OF STAIRS OR WALK UP A HILL: YES

## 2025-08-18 ASSESSMENT — PAIN - FUNCTIONAL ASSESSMENT: PAIN_FUNCTIONAL_ASSESSMENT: 0-10

## 2025-08-18 ASSESSMENT — PAIN SCALES - GENERAL: PAINLEVEL_OUTOF10: 0 - NO PAIN

## 2025-08-19 LAB
ATRIAL RATE: 86 BPM
BACTERIA UR CULT: NO GROWTH
HOLD SPECIMEN: NORMAL
P AXIS: 117 DEGREES
P OFFSET: 192 MS
P ONSET: 140 MS
PR INTERVAL: 156 MS
Q ONSET: 218 MS
QRS COUNT: 14 BEATS
QRS DURATION: 92 MS
QT INTERVAL: 362 MS
QTC CALCULATION(BAZETT): 433 MS
QTC FREDERICIA: 408 MS
R AXIS: 18 DEGREES
T AXIS: 5 DEGREES
T OFFSET: 399 MS
VENTRICULAR RATE: 86 BPM

## 2025-08-21 ENCOUNTER — APPOINTMENT (OUTPATIENT)
Dept: SURGERY | Facility: CLINIC | Age: 25
End: 2025-08-21
Payer: COMMERCIAL

## 2025-08-21 ENCOUNTER — CLINICAL SUPPORT (OUTPATIENT)
Dept: SURGERY | Facility: CLINIC | Age: 25
End: 2025-08-21
Payer: COMMERCIAL

## 2025-08-21 ENCOUNTER — OFFICE VISIT (OUTPATIENT)
Dept: SURGERY | Facility: CLINIC | Age: 25
End: 2025-08-21
Payer: COMMERCIAL

## 2025-08-21 VITALS
HEIGHT: 63 IN | DIASTOLIC BLOOD PRESSURE: 86 MMHG | HEART RATE: 75 BPM | SYSTOLIC BLOOD PRESSURE: 147 MMHG | WEIGHT: 293 LBS | OXYGEN SATURATION: 97 % | BODY MASS INDEX: 51.91 KG/M2

## 2025-08-21 DIAGNOSIS — K21.9 GASTROESOPHAGEAL REFLUX DISEASE, UNSPECIFIED WHETHER ESOPHAGITIS PRESENT: ICD-10-CM

## 2025-08-21 DIAGNOSIS — R11.0 NAUSEA: ICD-10-CM

## 2025-08-21 DIAGNOSIS — E66.01 MORBID OBESITY WITH BMI OF 60.0-69.9, ADULT (MULTI): Primary | ICD-10-CM

## 2025-08-21 DIAGNOSIS — Z91.89 AT RISK FOR DEEP VENOUS THROMBOSIS: ICD-10-CM

## 2025-08-21 DIAGNOSIS — Z98.84 BARIATRIC SURGERY STATUS: ICD-10-CM

## 2025-08-21 DIAGNOSIS — G89.18 POST-OP PAIN: ICD-10-CM

## 2025-08-21 PROCEDURE — 99215 OFFICE O/P EST HI 40 MIN: CPT | Performed by: SURGERY

## 2025-08-21 PROCEDURE — 3077F SYST BP >= 140 MM HG: CPT | Performed by: SURGERY

## 2025-08-21 PROCEDURE — 3008F BODY MASS INDEX DOCD: CPT | Performed by: SURGERY

## 2025-08-21 PROCEDURE — 3079F DIAST BP 80-89 MM HG: CPT | Performed by: SURGERY

## 2025-08-21 RX ORDER — OXYCODONE HCL 5 MG/5 ML
5 SOLUTION, ORAL ORAL EVERY 6 HOURS PRN
Qty: 140 ML | Refills: 0 | Status: SHIPPED | OUTPATIENT
Start: 2025-08-27 | End: 2025-09-03

## 2025-08-21 RX ORDER — ONDANSETRON 4 MG/1
4 TABLET, ORALLY DISINTEGRATING ORAL EVERY 6 HOURS PRN
Qty: 60 TABLET | Refills: 1 | Status: SHIPPED | OUTPATIENT
Start: 2025-08-21

## 2025-08-21 RX ORDER — ENOXAPARIN SODIUM 100 MG/ML
60 INJECTION SUBCUTANEOUS DAILY
Qty: 28 EACH | Refills: 0 | Status: SHIPPED | OUTPATIENT
Start: 2025-08-28 | End: 2025-09-25

## 2025-08-21 RX ORDER — OMEPRAZOLE 40 MG/1
40 CAPSULE, DELAYED RELEASE ORAL
Qty: 90 CAPSULE | Refills: 1 | Status: SHIPPED | OUTPATIENT
Start: 2025-08-21 | End: 2026-02-17

## 2025-08-21 ASSESSMENT — PATIENT HEALTH QUESTIONNAIRE - PHQ9
2. FEELING DOWN, DEPRESSED OR HOPELESS: NOT AT ALL
SUM OF ALL RESPONSES TO PHQ9 QUESTIONS 1 AND 2: 0
1. LITTLE INTEREST OR PLEASURE IN DOING THINGS: NOT AT ALL

## 2025-08-23 LAB
ANABASINE UR-MCNC: <5 NG/ML
COTININE UR-MCNC: <15 NG/ML
NICOTINE UR-MCNC: <15 NG/ML
TRANS-3-OH-COTININE UR-MCNC: <50 NG/ML

## 2025-08-27 ENCOUNTER — HOSPITAL ENCOUNTER (INPATIENT)
Facility: HOSPITAL | Age: 25
LOS: 2 days | Discharge: HOME | End: 2025-08-29
Attending: SURGERY | Admitting: SURGERY
Payer: COMMERCIAL

## 2025-08-27 ENCOUNTER — ANESTHESIA EVENT (OUTPATIENT)
Dept: OPERATING ROOM | Facility: HOSPITAL | Age: 25
End: 2025-08-27
Payer: COMMERCIAL

## 2025-08-27 ENCOUNTER — ANESTHESIA (OUTPATIENT)
Dept: OPERATING ROOM | Facility: HOSPITAL | Age: 25
End: 2025-08-27
Payer: COMMERCIAL

## 2025-08-27 PROBLEM — G47.33 OSA (OBSTRUCTIVE SLEEP APNEA): Status: ACTIVE | Noted: 2025-08-27

## 2025-08-27 PROCEDURE — 2500000005 HC RX 250 GENERAL PHARMACY W/O HCPCS: Performed by: ANESTHESIOLOGIST ASSISTANT

## 2025-08-27 PROCEDURE — 2500000004 HC RX 250 GENERAL PHARMACY W/ HCPCS (ALT 636 FOR OP/ED): Performed by: STUDENT IN AN ORGANIZED HEALTH CARE EDUCATION/TRAINING PROGRAM

## 2025-08-27 PROCEDURE — 2500000004 HC RX 250 GENERAL PHARMACY W/ HCPCS (ALT 636 FOR OP/ED): Performed by: ANESTHESIOLOGIST ASSISTANT

## 2025-08-27 PROCEDURE — RXMED WILLOW AMBULATORY MEDICATION CHARGE

## 2025-08-27 RX ORDER — MIDAZOLAM HYDROCHLORIDE 1 MG/ML
INJECTION, SOLUTION INTRAMUSCULAR; INTRAVENOUS AS NEEDED
Status: DISCONTINUED | OUTPATIENT
Start: 2025-08-27 | End: 2025-08-27

## 2025-08-27 RX ORDER — NORETHINDRONE AND ETHINYL ESTRADIOL 0.5-0.035
KIT ORAL AS NEEDED
Status: DISCONTINUED | OUTPATIENT
Start: 2025-08-27 | End: 2025-08-27

## 2025-08-27 RX ORDER — DEXMEDETOMIDINE IN 0.9 % NACL 20 MCG/5ML
SYRINGE (ML) INTRAVENOUS AS NEEDED
Status: DISCONTINUED | OUTPATIENT
Start: 2025-08-27 | End: 2025-08-27

## 2025-08-27 RX ORDER — GLYCOPYRROLATE 0.2 MG/ML
INJECTION INTRAMUSCULAR; INTRAVENOUS AS NEEDED
Status: DISCONTINUED | OUTPATIENT
Start: 2025-08-27 | End: 2025-08-27

## 2025-08-27 RX ORDER — FENTANYL CITRATE 50 UG/ML
INJECTION, SOLUTION INTRAMUSCULAR; INTRAVENOUS AS NEEDED
Status: DISCONTINUED | OUTPATIENT
Start: 2025-08-27 | End: 2025-08-27

## 2025-08-27 RX ORDER — PROMETHAZINE HYDROCHLORIDE 25 MG/ML
INJECTION, SOLUTION INTRAMUSCULAR; INTRAVENOUS AS NEEDED
Status: DISCONTINUED | OUTPATIENT
Start: 2025-08-27 | End: 2025-08-27

## 2025-08-27 RX ORDER — ONDANSETRON HYDROCHLORIDE 2 MG/ML
INJECTION, SOLUTION INTRAVENOUS AS NEEDED
Status: DISCONTINUED | OUTPATIENT
Start: 2025-08-27 | End: 2025-08-27

## 2025-08-27 RX ORDER — ROCURONIUM BROMIDE 10 MG/ML
INJECTION, SOLUTION INTRAVENOUS AS NEEDED
Status: DISCONTINUED | OUTPATIENT
Start: 2025-08-27 | End: 2025-08-27

## 2025-08-27 RX ORDER — SUCCINYLCHOLINE CHLORIDE 20 MG/ML INJECTION SOLUTION
SOLUTION AS NEEDED
Status: DISCONTINUED | OUTPATIENT
Start: 2025-08-27 | End: 2025-08-27

## 2025-08-27 RX ORDER — PROPOFOL 10 MG/ML
INJECTION, EMULSION INTRAVENOUS AS NEEDED
Status: DISCONTINUED | OUTPATIENT
Start: 2025-08-27 | End: 2025-08-27

## 2025-08-27 RX ADMIN — PROPOFOL 250 MG: 10 INJECTION, EMULSION INTRAVENOUS at 15:00

## 2025-08-27 RX ADMIN — CEFAZOLIN 3 G: 3 INJECTION, POWDER, FOR SOLUTION INTRAVENOUS at 15:10

## 2025-08-27 RX ADMIN — ONDANSETRON 4 MG: 2 INJECTION, SOLUTION INTRAMUSCULAR; INTRAVENOUS at 16:48

## 2025-08-27 RX ADMIN — DEXAMETHASONE SODIUM PHOSPHATE 8 MG: 4 INJECTION, SOLUTION INTRAMUSCULAR; INTRAVENOUS at 15:25

## 2025-08-27 RX ADMIN — FENTANYL CITRATE 100 MCG: 50 INJECTION, SOLUTION INTRAMUSCULAR; INTRAVENOUS at 15:00

## 2025-08-27 RX ADMIN — ROCURONIUM BROMIDE 45 MG: 50 INJECTION, SOLUTION INTRAVENOUS at 15:05

## 2025-08-27 RX ADMIN — PROPOFOL 50 MG: 10 INJECTION, EMULSION INTRAVENOUS at 16:50

## 2025-08-27 RX ADMIN — PROPOFOL 50 MG: 10 INJECTION, EMULSION INTRAVENOUS at 16:45

## 2025-08-27 RX ADMIN — EPHEDRINE SULFATE 5 MG: 50 INJECTION, SOLUTION INTRAVENOUS at 16:00

## 2025-08-27 RX ADMIN — GLYCOPYRROLATE 0.2 MG: 0.2 INJECTION, SOLUTION INTRAMUSCULAR; INTRAVENOUS at 15:36

## 2025-08-27 RX ADMIN — Medication 180 MG: at 15:01

## 2025-08-27 RX ADMIN — ROCURONIUM BROMIDE 10 MG: 50 INJECTION, SOLUTION INTRAVENOUS at 16:32

## 2025-08-27 RX ADMIN — Medication 20 MCG: at 15:00

## 2025-08-27 RX ADMIN — METRONIDAZOLE 500 MG: 500 SOLUTION INTRAVENOUS at 15:30

## 2025-08-27 RX ADMIN — PROPOFOL 50 MG: 10 INJECTION, EMULSION INTRAVENOUS at 16:32

## 2025-08-27 RX ADMIN — EPHEDRINE SULFATE 10 MG: 50 INJECTION, SOLUTION INTRAVENOUS at 16:04

## 2025-08-27 RX ADMIN — SUGAMMADEX 300 MG: 100 INJECTION, SOLUTION INTRAVENOUS at 16:56

## 2025-08-27 RX ADMIN — SODIUM CHLORIDE, SODIUM LACTATE, POTASSIUM CHLORIDE, AND CALCIUM CHLORIDE: .6; .31; .03; .02 INJECTION, SOLUTION INTRAVENOUS at 14:50

## 2025-08-27 RX ADMIN — ROCURONIUM BROMIDE 30 MG: 50 INJECTION, SOLUTION INTRAVENOUS at 15:45

## 2025-08-27 RX ADMIN — ROCURONIUM BROMIDE 5 MG: 50 INJECTION, SOLUTION INTRAVENOUS at 15:00

## 2025-08-27 RX ADMIN — Medication 4 MCG: at 16:35

## 2025-08-27 RX ADMIN — MIDAZOLAM 2 MG: 1 INJECTION INTRAMUSCULAR; INTRAVENOUS at 14:55

## 2025-08-27 RX ADMIN — PROPOFOL 50 MG: 10 INJECTION, EMULSION INTRAVENOUS at 16:40

## 2025-08-27 RX ADMIN — EPHEDRINE SULFATE 5 MG: 50 INJECTION, SOLUTION INTRAVENOUS at 15:39

## 2025-08-27 RX ADMIN — PROPOFOL 50 MG: 10 INJECTION, EMULSION INTRAVENOUS at 16:54

## 2025-08-27 RX ADMIN — PROMETHAZINE HYDROCHLORIDE 6.25 MG: 25 INJECTION INTRAMUSCULAR; INTRAVENOUS at 16:40

## 2025-08-27 SDOH — HEALTH STABILITY: MENTAL HEALTH: CURRENT SMOKER: 0

## 2025-08-27 ASSESSMENT — PAIN SCALES - GENERAL
PAINLEVEL_OUTOF10: 9
PAINLEVEL_OUTOF10: 0 - NO PAIN
PAINLEVEL_OUTOF10: 0 - NO PAIN
PAINLEVEL_OUTOF10: 6
PAIN_LEVEL: 0
PAINLEVEL_OUTOF10: 0 - NO PAIN
PAINLEVEL_OUTOF10: 7
PAINLEVEL_OUTOF10: 7

## 2025-08-27 ASSESSMENT — PAIN - FUNCTIONAL ASSESSMENT
PAIN_FUNCTIONAL_ASSESSMENT: 0-10

## 2025-08-27 ASSESSMENT — PAIN DESCRIPTION - ORIENTATION
ORIENTATION: MID
ORIENTATION: RIGHT;LOWER

## 2025-08-27 ASSESSMENT — PAIN DESCRIPTION - LOCATION
LOCATION: ABDOMEN

## 2025-08-27 ASSESSMENT — PAIN DESCRIPTION - DESCRIPTORS
DESCRIPTORS: SORE
DESCRIPTORS: BURNING
DESCRIPTORS: ACHING;DISCOMFORT;JABBING
DESCRIPTORS: SORE

## 2025-08-28 ENCOUNTER — APPOINTMENT (OUTPATIENT)
Dept: SURGERY | Facility: CLINIC | Age: 25
End: 2025-08-28
Payer: COMMERCIAL

## 2025-08-28 ENCOUNTER — APPOINTMENT (OUTPATIENT)
Dept: RADIOLOGY | Facility: HOSPITAL | Age: 25
End: 2025-08-28
Payer: COMMERCIAL

## 2025-08-28 SDOH — ECONOMIC STABILITY: INCOME INSECURITY: IN THE PAST 12 MONTHS HAS THE ELECTRIC, GAS, OIL, OR WATER COMPANY THREATENED TO SHUT OFF SERVICES IN YOUR HOME?: NO

## 2025-08-28 SDOH — SOCIAL STABILITY: SOCIAL INSECURITY: DOES ANYONE TRY TO KEEP YOU FROM HAVING/CONTACTING OTHER FRIENDS OR DOING THINGS OUTSIDE YOUR HOME?: NO

## 2025-08-28 SDOH — SOCIAL STABILITY: SOCIAL INSECURITY: WITHIN THE LAST YEAR, HAVE YOU BEEN AFRAID OF YOUR PARTNER OR EX-PARTNER?: NO

## 2025-08-28 SDOH — SOCIAL STABILITY: SOCIAL INSECURITY: ARE THERE ANY APPARENT SIGNS OF INJURIES/BEHAVIORS THAT COULD BE RELATED TO ABUSE/NEGLECT?: NO

## 2025-08-28 SDOH — SOCIAL STABILITY: SOCIAL INSECURITY: WITHIN THE LAST YEAR, HAVE YOU BEEN HUMILIATED OR EMOTIONALLY ABUSED IN OTHER WAYS BY YOUR PARTNER OR EX-PARTNER?: NO

## 2025-08-28 SDOH — ECONOMIC STABILITY: FOOD INSECURITY: WITHIN THE PAST 12 MONTHS, THE FOOD YOU BOUGHT JUST DIDN'T LAST AND YOU DIDN'T HAVE MONEY TO GET MORE.: NEVER TRUE

## 2025-08-28 SDOH — SOCIAL STABILITY: SOCIAL INSECURITY: HAS ANYONE EVER THREATENED TO HURT YOUR FAMILY OR YOUR PETS?: NO

## 2025-08-28 SDOH — ECONOMIC STABILITY: FOOD INSECURITY: WITHIN THE PAST 12 MONTHS, YOU WORRIED THAT YOUR FOOD WOULD RUN OUT BEFORE YOU GOT THE MONEY TO BUY MORE.: NEVER TRUE

## 2025-08-28 SDOH — SOCIAL STABILITY: SOCIAL INSECURITY: HAVE YOU HAD ANY THOUGHTS OF HARMING ANYONE ELSE?: NO

## 2025-08-28 SDOH — SOCIAL STABILITY: SOCIAL INSECURITY: DO YOU FEEL UNSAFE GOING BACK TO THE PLACE WHERE YOU ARE LIVING?: NO

## 2025-08-28 SDOH — SOCIAL STABILITY: SOCIAL INSECURITY
WITHIN THE LAST YEAR, HAVE YOU BEEN RAPED OR FORCED TO HAVE ANY KIND OF SEXUAL ACTIVITY BY YOUR PARTNER OR EX-PARTNER?: NO

## 2025-08-28 SDOH — SOCIAL STABILITY: SOCIAL INSECURITY: WERE YOU ABLE TO COMPLETE ALL THE BEHAVIORAL HEALTH SCREENINGS?: YES

## 2025-08-28 SDOH — SOCIAL STABILITY: SOCIAL INSECURITY: HAVE YOU HAD THOUGHTS OF HARMING ANYONE ELSE?: NO

## 2025-08-28 SDOH — SOCIAL STABILITY: SOCIAL INSECURITY
WITHIN THE LAST YEAR, HAVE YOU BEEN KICKED, HIT, SLAPPED, OR OTHERWISE PHYSICALLY HURT BY YOUR PARTNER OR EX-PARTNER?: NO

## 2025-08-28 SDOH — SOCIAL STABILITY: SOCIAL INSECURITY: ABUSE: ADULT

## 2025-08-28 SDOH — SOCIAL STABILITY: SOCIAL INSECURITY: DO YOU FEEL ANYONE HAS EXPLOITED OR TAKEN ADVANTAGE OF YOU FINANCIALLY OR OF YOUR PERSONAL PROPERTY?: NO

## 2025-08-28 SDOH — SOCIAL STABILITY: SOCIAL INSECURITY: ARE YOU OR HAVE YOU BEEN THREATENED OR ABUSED PHYSICALLY, EMOTIONALLY, OR SEXUALLY BY ANYONE?: NO

## 2025-08-28 ASSESSMENT — COGNITIVE AND FUNCTIONAL STATUS - GENERAL
DAILY ACTIVITIY SCORE: 20
CLIMB 3 TO 5 STEPS WITH RAILING: A LITTLE
DRESSING REGULAR UPPER BODY CLOTHING: A LITTLE
DRESSING REGULAR LOWER BODY CLOTHING: A LITTLE
MOBILITY SCORE: 18
MOVING FROM LYING ON BACK TO SITTING ON SIDE OF FLAT BED WITH BEDRAILS: A LITTLE
HELP NEEDED FOR BATHING: A LITTLE
STANDING UP FROM CHAIR USING ARMS: A LITTLE
PATIENT BASELINE BEDBOUND: NO
TOILETING: A LITTLE
WALKING IN HOSPITAL ROOM: A LITTLE
MOVING TO AND FROM BED TO CHAIR: A LITTLE
TURNING FROM BACK TO SIDE WHILE IN FLAT BAD: A LITTLE

## 2025-08-28 ASSESSMENT — ACTIVITIES OF DAILY LIVING (ADL)
WALKS IN HOME: INDEPENDENT
HEARING - LEFT EAR: FUNCTIONAL
TOILETING: INDEPENDENT
GROOMING: INDEPENDENT
JUDGMENT_ADEQUATE_SAFELY_COMPLETE_DAILY_ACTIVITIES: YES
BATHING: INDEPENDENT
LACK_OF_TRANSPORTATION: NO
DRESSING YOURSELF: INDEPENDENT
FEEDING YOURSELF: INDEPENDENT
HEARING - RIGHT EAR: FUNCTIONAL
LACK_OF_TRANSPORTATION: NO
PATIENT'S MEMORY ADEQUATE TO SAFELY COMPLETE DAILY ACTIVITIES?: YES
ADEQUATE_TO_COMPLETE_ADL: YES

## 2025-08-28 ASSESSMENT — LIFESTYLE VARIABLES
AUDIT-C TOTAL SCORE: 1
HOW MANY STANDARD DRINKS CONTAINING ALCOHOL DO YOU HAVE ON A TYPICAL DAY: 1 OR 2
AUDIT-C TOTAL SCORE: 1
HOW OFTEN DO YOU HAVE A DRINK CONTAINING ALCOHOL: MONTHLY OR LESS
SKIP TO QUESTIONS 9-10: 1
HOW OFTEN DO YOU HAVE 6 OR MORE DRINKS ON ONE OCCASION: NEVER

## 2025-08-28 ASSESSMENT — PATIENT HEALTH QUESTIONNAIRE - PHQ9
SUM OF ALL RESPONSES TO PHQ9 QUESTIONS 1 & 2: 0
2. FEELING DOWN, DEPRESSED OR HOPELESS: NOT AT ALL
1. LITTLE INTEREST OR PLEASURE IN DOING THINGS: NOT AT ALL

## 2025-08-28 ASSESSMENT — PAIN - FUNCTIONAL ASSESSMENT
PAIN_FUNCTIONAL_ASSESSMENT: 0-10

## 2025-08-28 ASSESSMENT — PAIN SCALES - GENERAL
PAINLEVEL_OUTOF10: 0 - NO PAIN

## 2025-08-29 ENCOUNTER — PHARMACY VISIT (OUTPATIENT)
Dept: PHARMACY | Facility: CLINIC | Age: 25
End: 2025-08-29
Payer: MEDICARE

## 2025-08-29 ASSESSMENT — PAIN SCALES - GENERAL
PAINLEVEL_OUTOF10: 0 - NO PAIN
PAINLEVEL_OUTOF10: 0 - NO PAIN

## 2025-08-29 ASSESSMENT — PAIN - FUNCTIONAL ASSESSMENT
PAIN_FUNCTIONAL_ASSESSMENT: 0-10
PAIN_FUNCTIONAL_ASSESSMENT: 0-10

## 2025-09-02 ENCOUNTER — TELEPHONE (OUTPATIENT)
Dept: SURGERY | Facility: CLINIC | Age: 25
End: 2025-09-02
Payer: COMMERCIAL

## 2025-09-02 PROBLEM — E66.813 CLASS 3 SEVERE OBESITY WITHOUT SERIOUS COMORBIDITY WITH BODY MASS INDEX (BMI) GREATER THAN OR EQUAL TO 70 IN ADULT: Status: ACTIVE | Noted: 2025-09-02

## 2025-09-02 PROBLEM — E66.01 MORBID OBESITY WITH BODY MASS INDEX (BMI) OF 60.0 TO 69.9 IN ADULT (MULTI): Status: RESOLVED | Noted: 2024-10-16 | Resolved: 2025-09-02

## 2025-09-02 PROBLEM — E66.813 CLASS 3 SEVERE OBESITY WITHOUT SERIOUS COMORBIDITY WITH BODY MASS INDEX (BMI) GREATER THAN OR EQUAL TO 70 IN ADULT: Status: RESOLVED | Noted: 2025-09-02 | Resolved: 2025-09-02

## 2025-09-04 ENCOUNTER — NUTRITION (OUTPATIENT)
Dept: SURGERY | Facility: CLINIC | Age: 25
End: 2025-09-04
Payer: COMMERCIAL

## 2025-09-04 ENCOUNTER — OFFICE VISIT (OUTPATIENT)
Dept: SURGERY | Facility: CLINIC | Age: 25
End: 2025-09-04
Payer: COMMERCIAL

## 2025-09-04 VITALS
BODY MASS INDEX: 51.91 KG/M2 | WEIGHT: 293 LBS | SYSTOLIC BLOOD PRESSURE: 143 MMHG | TEMPERATURE: 98.1 F | HEART RATE: 92 BPM | HEIGHT: 63 IN | OXYGEN SATURATION: 97 % | DIASTOLIC BLOOD PRESSURE: 86 MMHG

## 2025-09-04 DIAGNOSIS — Z51.81 VISIT FOR MONITORING LOVENOX THERAPY: ICD-10-CM

## 2025-09-04 DIAGNOSIS — E66.813 CLASS 3 SEVERE OBESITY WITH SERIOUS COMORBIDITY AND BODY MASS INDEX (BMI) GREATER THAN OR EQUAL TO 70 IN ADULT: ICD-10-CM

## 2025-09-04 DIAGNOSIS — Z91.89 AT RISK FOR DEEP VENOUS THROMBOSIS: ICD-10-CM

## 2025-09-04 DIAGNOSIS — E88.810 METABOLIC SYNDROME X: ICD-10-CM

## 2025-09-04 DIAGNOSIS — Z98.84 S/P GASTRIC BYPASS: ICD-10-CM

## 2025-09-04 DIAGNOSIS — E66.813 CLASS 3 SEVERE OBESITY WITH SERIOUS COMORBIDITY AND BODY MASS INDEX (BMI) GREATER THAN OR EQUAL TO 70 IN ADULT: Primary | ICD-10-CM

## 2025-09-04 DIAGNOSIS — Z98.84 S/P GASTRIC BYPASS: Primary | ICD-10-CM

## 2025-09-04 DIAGNOSIS — Z79.01 VISIT FOR MONITORING LOVENOX THERAPY: ICD-10-CM

## 2025-09-04 PROCEDURE — 99211 OFF/OP EST MAY X REQ PHY/QHP: CPT | Performed by: SURGERY

## 2025-09-04 PROCEDURE — 3079F DIAST BP 80-89 MM HG: CPT | Performed by: SURGERY

## 2025-09-04 PROCEDURE — 3008F BODY MASS INDEX DOCD: CPT | Performed by: SURGERY

## 2025-09-04 PROCEDURE — 1036F TOBACCO NON-USER: CPT | Performed by: SURGERY

## 2025-09-04 PROCEDURE — 3077F SYST BP >= 140 MM HG: CPT | Performed by: SURGERY

## 2025-09-04 RX ORDER — ERGOCALCIFEROL 1.25 MG/1
CAPSULE ORAL
COMMUNITY
Start: 2025-09-02 | End: 2025-09-04 | Stop reason: SDUPTHER

## 2025-09-04 ASSESSMENT — PAIN SCALES - GENERAL: PAINLEVEL_OUTOF10: 4

## 2025-09-29 ENCOUNTER — APPOINTMENT (OUTPATIENT)
Dept: SURGERY | Facility: CLINIC | Age: 25
End: 2025-09-29
Payer: COMMERCIAL

## 2025-11-17 ENCOUNTER — APPOINTMENT (OUTPATIENT)
Dept: SURGERY | Facility: CLINIC | Age: 25
End: 2025-11-17
Payer: COMMERCIAL